# Patient Record
Sex: FEMALE | Race: WHITE | NOT HISPANIC OR LATINO | Employment: UNEMPLOYED | URBAN - NONMETROPOLITAN AREA
[De-identification: names, ages, dates, MRNs, and addresses within clinical notes are randomized per-mention and may not be internally consistent; named-entity substitution may affect disease eponyms.]

---

## 2019-03-30 ENCOUNTER — OCCUPATIONAL MEDICINE (OUTPATIENT)
Dept: URGENT CARE | Facility: PHYSICIAN GROUP | Age: 34
End: 2019-03-30
Payer: COMMERCIAL

## 2019-03-30 ENCOUNTER — APPOINTMENT (OUTPATIENT)
Dept: RADIOLOGY | Facility: IMAGING CENTER | Age: 34
End: 2019-03-30
Attending: FAMILY MEDICINE
Payer: COMMERCIAL

## 2019-03-30 VITALS
DIASTOLIC BLOOD PRESSURE: 64 MMHG | OXYGEN SATURATION: 99 % | HEART RATE: 60 BPM | HEIGHT: 58 IN | SYSTOLIC BLOOD PRESSURE: 108 MMHG | BODY MASS INDEX: 26.66 KG/M2 | WEIGHT: 127 LBS | RESPIRATION RATE: 16 BRPM | TEMPERATURE: 98.1 F

## 2019-03-30 DIAGNOSIS — S79.912A INJURY OF LEFT HIP, INITIAL ENCOUNTER: ICD-10-CM

## 2019-03-30 DIAGNOSIS — S76.012A HIP STRAIN, LEFT, INITIAL ENCOUNTER: ICD-10-CM

## 2019-03-30 PROCEDURE — 99202 OFFICE O/P NEW SF 15 MIN: CPT | Performed by: FAMILY MEDICINE

## 2019-03-30 PROCEDURE — 73502 X-RAY EXAM HIP UNI 2-3 VIEWS: CPT | Mod: TC,FY,LT | Performed by: FAMILY MEDICINE

## 2019-03-30 RX ORDER — IBUPROFEN 200 MG
600 TABLET ORAL ONCE
Status: COMPLETED | OUTPATIENT
Start: 2019-03-30 | End: 2019-03-30

## 2019-03-30 RX ADMIN — Medication 600 MG: at 16:22

## 2019-03-30 NOTE — LETTER
"EMPLOYEE’S CLAIM FOR COMPENSATION/ REPORT OF INITIAL TREATMENT  FORM C-4    EMPLOYEE’S CLAIM - PROVIDE ALL INFORMATION REQUESTED   First Name  Crystal Last Name  Madan Birthdate                    1985                Sex  female Claim Number   Home Address  [address] Age  33 y.o. 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 (if applicable)  down stacking pallets. Up on a ladder twisted wrong hip popped    How did this injury or occupational disease occur? (Be specific an answer in detail. Use additional sheet if necessary)  Hip popped sent shooting pain thru [employer]   If you believe that you have an occupational disease, when did you first have knowledge of the disability and it relationship to your employment?  na Witnesses to the Accident  mesha nunez      Nature of Injury or Occupational Disease  Strain  Part(s) of Body Injured or Affected  Hip (L), ,     I certify that the above is true and correct to the best of my knowledge and that I have provided this information in order to obtain the " benefits of [place]).  I hereby authorize any physician, chiropractor, surgeon, practitioner, or other person, any hospital, including [facility] or [facility], any medical service organization, any insurance company, or other institution or organization to release to each other, any medical or other information, including benefits paid or payable, pertinent to this injury or disease, except information relative to diagnosis, treatment and/or counseling for AIDS, psychological conditions, alcohol or controlled substances, for which I must give specific authorization.  A Photostat of this authorization shall be as valid as the original.     Date   Place   Employee’s Signature   THIS REPORT MUST BE COMPLETED AND MAILED WITHIN 3 WORKING DAYS OF TREATMENT   Place  [facility]  Name of [facility]   Date  3/30/2019 Diagnosis  (S76.012A) Hip strain, left, initial encounter Is there evidence the injured employee was under the influence of alcohol and/or another controlled substance at the time of accident?   Hour  3:45 PM Description of Injury or Disease  The encounter diagnosis was Hip strain, left, initial encounter. No   Treatment  Relative rest, ice, otc nsaid  Have you advised the patient to remain off work five days or more? No   X-Ray Findings  Negative  Comments:no fracture   If Yes   From Date  To Date      From information given by the employee, together with medical evidence, can you directly connect this injury or occupational disease as job incurred?  Yes If No Full Duty  No Modified Duty  Yes   Is additional medical care by a physician indicated?  No If Modified Duty, Specify any Limitations / Restrictions  Squatting limited to <1hr only activity of daily living  Walking limited to 2hr  Weight limit 10# lifting and carrying     Do you know of any previous injury or  "disease contributing to this condition or occupational disease?                            Yes  Comments:left congenital hip dysplasia   Date  3/30/2019 Print Doctor’s Name Rasheed Sen M.D. I certify the employer’s copy of  this form was mailed on:   Address  [address] Insurer’s Use Only     [facility] Zip  [address]    Provider’s Tax ID Number  [id number] Telephone  Dept: [phone]        godfrey-RASHEED Rico M.D.   e-Signature: Dr. Troy Vasquez, Medical Director Degree  MD        ORIGINAL-TREATING PHYSICIAN OR CHIROPRACTOR    PAGE 2-INSURER/TPA    PAGE 3-EMPLOYER    PAGE 4-EMPLOYEE             Form C-4 (rev10/07)              BRIEF DESCRIPTION OF RIGHTS AND BENEFITS  (Pursuant to [place]RS 616C.050)    Notice of Injury or Occupational Disease (Incident Report Form C-1): If an injury or occupational disease (OD) arises out of and in the  course of employment, you must provide written notice to your employer as soon as practicable, but no later than 7 days after the accident or  OD. Your employer shall maintain a sufficient supply of the required forms.    Claim for Compensation (Form C-4): If medical treatment is sought, the form C-4 is available at the place of initial treatment. A completed  \"Claim for Compensation\" (Form C-4) must be filed within 90 days after an accident or OD. The treating physician or chiropractor must,  within 3 working days after treatment, complete and mail to the employer, the employer's insurer and third-party , the Claim for  Compensation.    Medical Treatment: If you require medical treatment for your on-the-job injury or OD, you may be required to select a physician or  chiropractor from a list provided by your workers’ compensation insurer, if it has contracted with an Organization for Managed Care (MCO) or  Preferred Provider Organization (PPO) or providers of health care. If your employer has not entered into a contract with an MCO or PPO, " you  may select a physician or chiropractor from the Panel of Physicians and Chiropractors. Any medical costs related to your industrial injury or  OD will be paid by your insurer.    Temporary Total Disability (TTD): If your doctor has certified that you are unable to work for a period of at least 5 consecutive days, or 5  cumulative days in a 20-day period, or places restrictions on you that your employer does not accommodate, you may be entitled to TTD  compensation.    Temporary Partial Disability (TPD): If the wage you receive upon reemployment is less than the compensation for TTD to which you are  entitled, the insurer may be required to pay you TPD compensation to make up the difference. TPD can only be paid for a maximum of 24  months.    Permanent Partial Disability (PPD): When your medical condition is stable and there is an indication of a PPD as a result of your injury or  OD, within 30 days, your insurer must arrange for an evaluation by a rating physician or chiropractor to determine the degree of your PPD. The  amount of your PPD award depends on the date of injury, the results of the PPD evaluation and your age and wage.    Permanent Total Disability (PTD): If you are medically certified by a treating physician or chiropractor as permanently and totally disabled  and have been granted a PTD status by your insurer, you are entitled to receive monthly benefits not to exceed 66 2/3% of your average  monthly wage. The amount of your PTD payments is subject to reduction if you previously received a PPD award.    Vocational Rehabilitation Services: You may be eligible for vocational rehabilitation services if you are unable to return to the job due to a  permanent physical impairment or permanent restrictions as a result of your injury or occupational disease.    Transportation and Per Danyell Reimbursement: You may be eligible for travel expenses and per danyell associated with medical treatment.    Reopening:  You may be able to reopen your claim if your condition worsens after claim closure.    Appeal Process: If you disagree with a written determination issued by the insurer or the insurer does not respond to your request, you may  appeal to the Department of Administration, , by following the instructions contained in your determination letter. You must  appeal the determination within 70 days from the date of the determination letter at [address], [address], [place], [place]  [address], or [address], [address], [place], [place] [address]. If you disagree with the  decision, you may appeal to the  Department of Administration, . You must file your appeal within 30 days from the date of the  decision  letter at [address], [address], [place], [place] [address], or [address], [place], [place] [address]. If you  disagree with a decision of an , you may file a petition for judicial review with the District Court. You must do so within 30  days of the Appeal Officer’s decision. You may be represented by an  at your own expense or you may contact the [facility] for possible  representation.    [place]  for Injured Workers (NAIW): If you disagree with a  decision, you may request that NAIW represent you  without charge at an  Hearing. For information regarding denial of benefits, you may contact the [facility] at: [address], [address], [place] [address], [phone], or [address], [place] [address], [phone]    To File a Complaint with the Division: If you wish to file a complaint with the  of the Division of Industrial Relations (DIR),  please contact the Workers’ Compensation Section, [address], [address], [place], [place] [address], telephone [phone], or  [address] [facility] [address],  [address], [place] [address], telephone [phone].    For assistance with Workers’ Compensation Issues: you may contact the Office of the Governor Consumer Health Assistance, [address] [place] [address], [address], [place], [place] [address], Toll Free 1-747.211.2261, Web site: [email or web address].nv., E-mail  [email or web address].                                                                                                                                                                                                                                   __________________________________________________________________                                                                   _________________                Employee Name / Signature                                                                                                                                                       Date                                                                                                                                                                                                     D-2 (rev. 10/07)

## 2019-03-30 NOTE — LETTER
[address] [place] [address]  Phone:  [phone] - Fax:  [phone]   [facility] Progress Report and Disability Certification  Date of Service: 3/30/2019   No Show:  No  Date / Time of Next Visit: 4/3/2019   Claim Information   Patient Name: Chelsea Hager  Claim Number:     Employer: [employer] Date of Injury: 3/30/2019     Insurer /tpa [employer] ID / SSN:     Occupation: fresh   Diagnosis: The encounter diagnosis was Hip strain, left, initial encounter.    Medical Information   Related to Industrial Injury? Yes    Subjective Complaints:  DOI: 3/30/2019  Left hip injury due to slipping off ladder from bottom step to floor and felt immediate pain in left hip. She does have chronic pain in hip due to CHD but this was sudden and severe. Ambulatory with limp. Some improvement with ibuprofen, now 3/10 severity. No other injury. No second job or outside activity contributing.      Objective Findings: Left hip: tender, pain reproduced with active and passive movement in all planes. Left leg chronically shorter than right. No rotation. Distal neuro/vascular intact.        Pre-Existing Condition(s):     Assessment:   Initial Visit    Status:    Permanent Disability:No    Plan:   Comments:relative rest, ice, otc nsaid    Diagnostics:      Comments:       Disability Information   Status: Released to Restricted Duty    From:  3/30/2019  Through: 4/3/2019 Restrictions are: Temporary   Physical Restrictions   Sitting:    Standing:    Stooping:    Bending:      Squatting:  < or = to 1 hr/day Walking:  < or = to 2 hrs/day Climbing:    Pushing:      Pulling:    Other:    Reaching Above Shoulder (L):   Reaching Above Shoulder (R):       Reaching Below Shoulder (L):    Reaching Below Shoulder (R):      Not to exceed Weight Limits   Carrying(hrs):   Weight Limit(lb): < or = to 25 pounds Lifting(hrs):   Weight  Limit(lb): < or =  to 10 pounds   Comments:      Repetitive Actions   Hands: i.e. Fine Manipulations from Grasping:     Feet: i.e. Operating Foot Controls:     Driving / Operate Machinery:     Physician Name: aRsheed Sen M.D. Physician Signature: RASHEED Aguila M.D. e-Signature: Dr. Troy Vasquez, Medical Director   Clinic Name / Location: [address] [address] Clinic Phone Number: Dept: [phone]   Appointment Time: 3:05 Pm Visit Start Time: 3:45 PM   Check-In Time:  3:08 Pm Visit Discharge Time:  4:55pm   Original-Treating Physician or Chiropractor    Page 2-Insurer/TPA    Page 3-Employer    Page 4-Employee

## 2019-03-31 ASSESSMENT — ENCOUNTER SYMPTOMS
ROS SKIN COMMENTS: NO ABRASION OR LACERATION
BACK PAIN: 0

## 2019-04-01 NOTE — PROGRESS NOTES
"Subjective:      Chelsea Hager is a 33 y.o. female who presents with Work-Related Injury (New WC for L foot injury/ DOI: 3/30/19/ [employer])      DOI: 3/30/2019  Left hip injury due to slipping off ladder from bottom step to floor and felt immediate pain in left hip. She does have chronic pain in hip due to CHD but this was sudden and severe. Ambulatory with limp. Some improvement with ibuprofen, now 3/10 severity. No other injury. No second job or outside activity contributing.        HPI    Review of Systems   Musculoskeletal: Negative for back pain.        No knee pain   Skin:        No abrasion or laceration          Objective:     /64   Pulse 60   Temp 36.7 °C (98.1 °F) (Temporal)   Resp 16   Ht 1.473 m (4' 10\")   Wt 57.6 kg (127 lb)   SpO2 99%   BMI 26.54 kg/m²      Physical Exam   Constitutional: She is oriented to person, place, and time. She appears well-developed and well-nourished. No distress.   Neurological: She is alert and oriented to person, place, and time.   Skin: Skin is warm and dry. No rash noted.       Left hip: tender, pain reproduced with active and passive movement in all planes. Left leg chronically shorter than right. No rotation. Distal neuro/vascular intact.            Assessment/Plan:   X-ray left hip per radiology:  1.  No radiographic evidence of acute traumatic injury.    2.  Left acetabular and femoral deformity is noted as described above. Findings could be due to left-sided long-standing congenital hip dysplasia.      1. Hip strain, left, initial encounter       Ice, NSAID, relative rest.    Reviewed x-ray with Chelsea.  There is significant congenital abnormality of left hip likely contributing to symptoms although she does have an acute work-related hip strain on top of this.    Work restrictions and follow-up on D 39  "

## 2019-04-03 ENCOUNTER — OCCUPATIONAL MEDICINE (OUTPATIENT)
Dept: URGENT CARE | Facility: PHYSICIAN GROUP | Age: 34
End: 2019-04-03
Payer: COMMERCIAL

## 2019-04-03 VITALS
RESPIRATION RATE: 16 BRPM | TEMPERATURE: 98.1 F | OXYGEN SATURATION: 99 % | DIASTOLIC BLOOD PRESSURE: 78 MMHG | BODY MASS INDEX: 26.45 KG/M2 | HEIGHT: 58 IN | SYSTOLIC BLOOD PRESSURE: 120 MMHG | WEIGHT: 126 LBS | HEART RATE: 76 BPM

## 2019-04-03 DIAGNOSIS — S76.012D STRAIN OF LEFT HIP, SUBSEQUENT ENCOUNTER: ICD-10-CM

## 2019-04-03 PROCEDURE — 99214 OFFICE O/P EST MOD 30 MIN: CPT | Mod: 29 | Performed by: NURSE PRACTITIONER

## 2019-04-03 ASSESSMENT — ENCOUNTER SYMPTOMS
DIARRHEA: 0
HEADACHES: 0
DIAPHORESIS: 0
CONSTIPATION: 0
PALPITATIONS: 0
CHILLS: 0
TINGLING: 0
COUGH: 0
MYALGIAS: 0
BACK PAIN: 0
NAUSEA: 0
VOMITING: 0
SHORTNESS OF BREATH: 0
ABDOMINAL PAIN: 0
WHEEZING: 0
DIZZINESS: 0
WEAKNESS: 0
FEVER: 0

## 2019-04-03 NOTE — PROGRESS NOTES
"Subjective:   Chelsea Hager is a 33 y.o. female who presents for Hip Pain (strain)    DOI 3/30/2019 Follow up: Patient states without improvement of left hip. States she has still been stocking at work and walking more than recommended at work. Pain starts from left hip and radiates to the left groin. Has been taking OTC Ibuprofen with little relief.    HPI   Initial Copied HPI:  DOI: 3/30/2019  Left hip injury due to slipping off ladder from bottom step to floor and felt immediate pain in left hip. She does have chronic pain in hip due to CHD but this was sudden and severe. Ambulatory with limp. Some improvement with ibuprofen, now 3/10 severity. No other injury. No second job or outside activity contributing.     Review of Systems   Constitutional: Negative for chills, diaphoresis and fever.   Respiratory: Negative for cough, shortness of breath and wheezing.    Cardiovascular: Negative for chest pain and palpitations.   Gastrointestinal: Negative for abdominal pain, constipation, diarrhea, nausea and vomiting.   Musculoskeletal: Negative for back pain and myalgias.        Left hip pain   Skin: Negative for itching and rash.   Neurological: Negative for dizziness, tingling, weakness and headaches.   All other systems reviewed and are negative.      PMH:  has no past medical history on file.  MEDS: No current outpatient prescriptions on file.  ALLERGIES: No Known Allergies  SURGHX: No past surgical history on file.  SOCHX:  reports that she has been smoking Cigarettes.  She has never used smokeless tobacco. She reports that she does not drink alcohol or use drugs.  FH: Family history was reviewed, no pertinent findings to report     Objective:   /78   Pulse 76   Temp 36.7 °C (98.1 °F) (Temporal)   Resp 16   Ht 1.473 m (4' 10\")   Wt 57.2 kg (126 lb)   SpO2 99%   BMI 26.33 kg/m²   Physical Exam   Constitutional: She appears well-developed and well-nourished. No distress.   HENT:   Head: Normocephalic. "   Right Ear: Hearing normal.   Left Ear: Hearing normal.   Nose: Nose normal.   Eyes: Pupils are equal, round, and reactive to light. Conjunctivae, EOM and lids are normal.   Neck: Normal range of motion.   Cardiovascular: Normal rate, regular rhythm and normal heart sounds.    Pulmonary/Chest: Effort normal and breath sounds normal. No respiratory distress. She has no decreased breath sounds. She has no wheezes.   Musculoskeletal:   See comments below   Neurological: She is alert.   Skin: Skin is warm. No rash noted. She is not diaphoretic.   Psychiatric: She has a normal mood and affect. Her speech is normal and behavior is normal. Judgment and thought content normal.   Vitals reviewed.    Left hip: tender, pain reproduced with active and passive movement in all planes. No rotation. Distal neuro/vascular intact. Gait with limp   Assessment/Plan:   Assessment    1. Strain of left hip, subsequent encounter  - REFERRAL TO SPORTS MEDICINE    May take over-the-counter Ibuprofen 600-800 mg every 8 hours as needed for pain  Decreased work restrictions; see D39  Referral to Sports Medicine since no improvement with pain.    Differential diagnosis, natural history, supportive care, and indications for immediate follow-up discussed.

## 2019-04-03 NOTE — LETTER
[address] [place] [address]  Phone:  [phone] - Fax:  [phone]   [facility] Progress Report and Disability Certification  Date of Service: 4/3/2019   No Show:  No  Date / Time of Next Visit: 4/10/2019   Claim Information   Patient Name: Chelsea Hager  Claim Number:     Employer: [employer]  Date of Injury: 3/30/2019     Insurer / TPA: [employer]  ID / SSN:     Occupation: fresh   Diagnosis: The encounter diagnosis was Strain of left hip, subsequent encounter.    Medical Information   Related to Industrial Injury? Yes    Subjective Complaints:  DOI 3/30/2019 Follow up: Patient states without improvement of left hip. States she has still been stocking at work and walking more than recommended at work. Pain starts from left hip and radiates to the left groin. Has been taking OTC Ibuprofen with little relief.    Objective Findings: Left hip: tender, pain reproduced with active and passive movement in all planes. No rotation. Distal neuro/vascular intact. Gait with limp   Pre-Existing Condition(s):     Assessment:   Condition Same    Status: Additional Care Required  Permanent Disability:No    Plan:      Diagnostics:      Comments:       Disability Information   Status: Released to Restricted Duty    From:  4/3/2019  Through: 4/10/2019 Restrictions are: Temporary   Physical Restrictions   Sitting:    Standing:  < or = to 1 hr/day Stoopin hrs/day [place] hrs/day   Squattin hrs/day Walking:  < or = to 1 hr/day Climbing:    Pushing:      Pulling:    Other:    Reaching Above Shoulder (L):   Reaching Above Shoulder (R):       Reaching Below Shoulder (L):    Reaching Below Shoulder (R):      Not to exceed Weight Limits   Carrying(hrs):   Weight Limit(lb): < or = to 10 pounds Lifting(hrs):   Weight  Limit(lb): < or = to 10 pounds   Comments: Referral to Sports Medicine for evaluation since  no improvement with pain.  May take over-the-counter Ibuprofen 600-800 mg every 8 hours as needed for pain  Recommend working at a desk, where she is able to sit. Walking no more than 1 hour during shift.    Repetitive Actions   Hands: i.e. Fine Manipulations from Grasping:     Feet: i.e. Operating Foot Controls:     Driving / Operate Machinery:     Physician Name: Nu Rodas Physician Signature: NU Hu  e-Signature: Dr. Troy Vasquez, Medical Director   Clinic Name / Location: [address] [address] Clinic Phone Number: Dept: [phone]   Appointment Time: 3:30 Pm Visit Start Time: 4:01 PM   Check-In Time:  3:28 Pm Visit Discharge Time:  4:22pm   Original-Treating Physician or Chiropractor    Page 2-Insurer/TPA    Page 3-Employer    Page 4-Employee

## 2019-04-10 ENCOUNTER — OCCUPATIONAL MEDICINE (OUTPATIENT)
Dept: URGENT CARE | Facility: PHYSICIAN GROUP | Age: 34
End: 2019-04-10
Payer: COMMERCIAL

## 2019-04-10 VITALS
DIASTOLIC BLOOD PRESSURE: 68 MMHG | RESPIRATION RATE: 16 BRPM | OXYGEN SATURATION: 97 % | BODY MASS INDEX: 26.75 KG/M2 | SYSTOLIC BLOOD PRESSURE: 112 MMHG | HEART RATE: 67 BPM | TEMPERATURE: 98.1 F | WEIGHT: 128 LBS

## 2019-04-10 DIAGNOSIS — S76.012D STRAIN OF LEFT HIP, SUBSEQUENT ENCOUNTER: ICD-10-CM

## 2019-04-10 PROCEDURE — 20610 DRAIN/INJ JOINT/BURSA W/O US: CPT | Performed by: FAMILY MEDICINE

## 2019-04-10 PROCEDURE — 99214 OFFICE O/P EST MOD 30 MIN: CPT | Mod: 25 | Performed by: FAMILY MEDICINE

## 2019-04-10 RX ORDER — TRIAMCINOLONE ACETONIDE 40 MG/ML
40 INJECTION, SUSPENSION INTRA-ARTICULAR; INTRAMUSCULAR ONCE
Status: COMPLETED | OUTPATIENT
Start: 2019-04-10 | End: 2019-04-10

## 2019-04-10 RX ORDER — ACETAMINOPHEN 325 MG/1
650 TABLET ORAL EVERY 4 HOURS PRN
COMMUNITY
End: 2023-07-17

## 2019-04-10 RX ORDER — IBUPROFEN 200 MG
200 TABLET ORAL EVERY 6 HOURS PRN
COMMUNITY
End: 2019-04-17

## 2019-04-10 RX ADMIN — TRIAMCINOLONE ACETONIDE 40 MG: 40 INJECTION, SUSPENSION INTRA-ARTICULAR; INTRAMUSCULAR at 16:14

## 2019-04-10 ASSESSMENT — PAIN SCALES - GENERAL: PAINLEVEL: 10=SEVERE PAIN

## 2019-04-10 NOTE — LETTER
[facility]  [address] [place] [address] [place] [address]  Phone:  [phone] - Fax:  [phone]   [facility] Progress Report and Disability Certification  Date of Service: 4/10/2019   No Show:  No  Date / Time of Next Visit: Return on 4/17/19 if has not seen Sports Medicine.   Claim Information   Patient Name: Chelsea Hager  Claim Number:     Employer: [employer]  Date of Injury: 3/30/2019     Insurer / TPA: [employer]  ID / SSN:     Occupation: fresh   Diagnosis: The encounter diagnosis was Strain of left hip, subsequent encounter.    Medical Information   Related to Industrial Injury? Yes    Subjective Complaints:  DOI: 3/30/19. Compared to visit, 4/3/19, left hip is worse. Ibuprofen and Tylenol are not helping. Using cane to ambulate. Doing sit down job at work. Referral to Sports Medicine was ordered on 4/3/19, but she has not heard anything about this. Has focal spot she can pinpoint as main source of pain.   Objective Findings: Severe antalgic gait and decreased left hip range of motion due to pain. Focal tenderness to palpation left hip in region of posterolateral acetabular area.   Pre-Existing Condition(s):     Assessment:   Condition Worsened    Status: Additional Care Required  Comments:Return on 4/17/19 if has not seen Sports Medicine.  Permanent Disability:No    Plan:      Diagnostics:      Comments:  Due to duration and severity of pain, getting worse, and able to isolate focal area of pain, offered steroid injection into this area. She is agreeable. Skin cleansed with alcohol. Mixture of 1 cc Kenalog 40 mg/ml and 2 cc Lidocaine 2% without inject  ed into painful area without complications. Pain went from 7/10 severity to 0/10 severity within 10 minutes after injection. Advised Lidocaine will wear off later and pain may return, but steroid will usually take effect within few days. Sports Medic  ine referral  looks like it has been approved and I gave her that info - she will call for appointment.    Disability Information   Status: Released to Restricted Duty    From:  4/10/2019  Through: 4/17/2019 Restrictions are: Temporary   Physical Restrictions   Sitting:    Standing:    Stooping:    Bending:      Squatting:    Walking:    Climbing:    Pushing:      Pulling:    Other:    Reaching Above Shoulder (L):   Reaching Above Shoulder (R):       Reaching Below Shoulder (L):    Reaching Below Shoulder (R):      Not to exceed Weight Limits   Carrying(hrs):   Weight Limit(lb):   Lifting(hrs):   Weight  Limit(lb):     Comments: Sedentary work only.    Repetitive Actions   Hands: i.e. Fine Manipulations from Grasping:     Feet: i.e. Operating Foot Controls:     Driving / Operate Machinery:     Physician Name: Freddie Thakur M.D. Physician Signature: FREDDIE Bernardo M.D. e-Signature: Dr. Troy Vasquez, Medical Director   Clinic Name / Location: [address] [address] Clinic Phone Number: Dept: [phone]   Appointment Time: 3:30 Pm Visit Start Time: 3:25 PM   Check-In Time:  3:21 Pm Visit Discharge Time:  4:06PM   Original-Treating Physician or Chiropractor    Page 2-Insurer/TPA    Page 3-Employer    Page 4-Employee

## 2019-04-10 NOTE — PROGRESS NOTES
Chief Complaint:    Chief Complaint   Patient presents with   • Hip Pain     Left hip pain WC [facility]        History of Present Illness:    DOI: 3/30/19. Compared to visit, 4/3/19, left hip is worse. Ibuprofen and Tylenol are not helping. Using cane to ambulate. Doing sit down job at work. Referral to Sports Medicine was ordered on 4/3/19, but she has not heard anything about this. Has focal spot she can pinpoint as main source of pain.      Review of Systems:    Constitutional: Negative for fever, chills, and diaphoresis.   Eyes: Negative for change in vision, photophobia, pain, redness, and discharge.  ENT: Negative for ear pain, ear discharge, hearing loss, tinnitus, nasal congestion, nosebleeds, and sore throat.    Respiratory: Negative for cough, hemoptysis, sputum production, shortness of breath, wheezing, and stridor.    Cardiovascular: Negative for chest pain, palpitations, orthopnea, claudication, leg swelling, and PND.   Gastrointestinal: Negative for abdominal pain, nausea, vomiting, diarrhea, constipation, blood in stool, and melena.   Genitourinary: Negative for dysuria, urinary urgency, urinary frequency, hematuria, and flank pain.   Musculoskeletal: See HPI.   Skin: Negative for rash and itching.   Neurological: Negative for dizziness, tingling, tremors, sensory change, speech change, focal weakness, seizures, loss of consciousness, and headaches.   Endo: Negative for polydipsia.   Heme: Does not bruise/bleed easily.   Psychiatric/Behavioral: Negative for depression, suicidal ideas, hallucinations, memory loss and substance abuse. The patient is not nervous/anxious and does not have insomnia.        Past Medical History:    History reviewed. No pertinent past medical history.    Past Surgical History:    History reviewed. No pertinent surgical history.    Social History:    Social History     Social History   • Marital status: Single     Spouse name: N/A   • Number of children: N/A   • Years of education:  N/A     Occupational History   • Not on file.     Social History Main Topics   • Smoking status: Current Every Day Smoker     Types: Cigarettes   • Smokeless tobacco: Never Used   • Alcohol use No   • Drug use: No   • Sexual activity: Not on file     Other Topics Concern   • Not on file     Social History Narrative   • No narrative on file     Family History:    History reviewed. No pertinent family history.    Medications:    No current outpatient prescriptions on file prior to visit.     No current facility-administered medications on file prior to visit.      Allergies:    No Known Allergies      Vitals:    Vitals:    04/10/19 1526   BP: 112/68   Pulse: 67   Resp: 16   Temp: 36.7 °C (98.1 °F)   TempSrc: Temporal   SpO2: 97%   Weight: 58.1 kg (128 lb)       Physical Exam:    Constitutional: Vital signs reviewed. Appears well-developed and well-nourished. No acute distress.   Eyes: Sclera white, conjunctivae clear.  ENT: External ears normal. Hearing normal.  Pulmonary/Chest: Respirations non-labored.  Musculoskeletal: Severe antalgic gait and decreased left hip range of motion due to pain. Focal tenderness to palpation left hip in region of posterolateral acetabular area.  Neurological: Alert and oriented to person, place, and time. Muscle tone normal. Coordination normal. Light touch and sensation normal.   Skin: No rashes or lesions. Warm, dry, normal turgor.  Psychiatric: Normal mood and affect. Behavior is normal. Judgment and thought content normal.       Assessment / Plan:    1. Strain of left hip, subsequent encounter      Discussed with her DDX, management options, and risks, benefits, and alternatives to treatment plan agreed upon.    Work restrictions: Sedentary work only.     Due to duration and severity of pain, getting worse, and able to isolate focal area of pain, offered steroid injection into this area. She is agreeable. Skin cleansed with alcohol. Mixture of 1 cc Kenalog 40 mg/ml and 2 cc  Lidocaine 2% without injected into painful area without complications. Pain went from 7/10 severity to 0/10 severity within 10 minutes after injection. Advised Lidocaine will wear off later and pain may return, but steroid will usually take effect within few days.     Sports Medicine referral looks like it has been approved and I gave her that info - she will call for appointment.     Return on 4/17/19 if has not seen Sports Medicine.

## 2019-04-17 ENCOUNTER — OCCUPATIONAL MEDICINE (OUTPATIENT)
Dept: URGENT CARE | Facility: PHYSICIAN GROUP | Age: 34
End: 2019-04-17
Payer: COMMERCIAL

## 2019-04-17 VITALS
RESPIRATION RATE: 14 BRPM | HEIGHT: 58 IN | BODY MASS INDEX: 26.87 KG/M2 | WEIGHT: 128 LBS | SYSTOLIC BLOOD PRESSURE: 102 MMHG | DIASTOLIC BLOOD PRESSURE: 60 MMHG | TEMPERATURE: 99.1 F | OXYGEN SATURATION: 98 % | HEART RATE: 80 BPM

## 2019-04-17 DIAGNOSIS — M25.552 LEFT HIP PAIN: ICD-10-CM

## 2019-04-17 PROCEDURE — 99214 OFFICE O/P EST MOD 30 MIN: CPT | Mod: 29 | Performed by: PHYSICIAN ASSISTANT

## 2019-04-17 RX ORDER — CYCLOBENZAPRINE HCL 5 MG
5-10 TABLET ORAL 2 TIMES DAILY PRN
Qty: 20 TAB | Refills: 0 | Status: SHIPPED | OUTPATIENT
Start: 2019-04-17 | End: 2023-07-17

## 2019-04-17 RX ORDER — NAPROXEN 500 MG/1
500 TABLET ORAL 2 TIMES DAILY WITH MEALS
Qty: 30 TAB | Refills: 0 | Status: SHIPPED | OUTPATIENT
Start: 2019-04-17 | End: 2023-07-17

## 2019-04-17 ASSESSMENT — ENCOUNTER SYMPTOMS
FEVER: 0
MYALGIAS: 1
SORE THROAT: 1
COUGH: 0
CHILLS: 0

## 2019-04-17 NOTE — PROGRESS NOTES
"Subjective:      Chelsea Hager is a 33 y.o. female who presents with Follow-Up ( [facility] for L hip)      Date of incident March 30, 2020.  Patient employee of [employer].  Reports slipping off the third rung of a ladder and feeling immediate left hip pain.  She has a history of congenital hip abnormality.  She experienced acute pain exacerbation from this fall.  She has been seen in the urgent care on March 31st, April 3 and April 10.  She has been referred to sports medicine and has an appointment in approximately 1 week.  She is taken anti-inflammatories over-the-counter with not much relief.  Last appointment, 7 days ago she was given a steroid injection and states that once the lidocaine wore off her symptoms worsen.  Sitting makes it better walking and applying pressure on her heel makes her hip pain worse.  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  She would like a cushion to sit on.     Past medical history: Congenital abnormality of the left hip      HPI    Review of Systems   Constitutional: Negative for chills and fever.   HENT: Positive for sore throat.    Respiratory: Negative for cough.    Musculoskeletal: Positive for joint pain and myalgias.          Objective:     /60   Pulse 80   Temp 37.3 °C (99.1 °F) (Temporal)   Resp 14   Ht 1.473 m (4' 10\")   Wt 58.1 kg (128 lb)   SpO2 98%   BMI 26.75 kg/m²      Physical Exam    General in no x3 no acute distress  Vitals are listed unremarkable  ENT is unremarkable  Neck soft supple without cervical lymph  Cardiovascular: Regular rate and rhythm  Lungs clear to auscultation bilaterally  Musculoskeletal: Patient walks with a gait that has a limp on the left side.  She prefers to apply pressure to the toes instead of the heel of her foot on the left side.  She has minimal pain with external rotation at the hip 4 out of 5 strength against resistance.  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  No swelling, " bruising or redness noted  Neurovascularly intact with a 2-second cap refill good distal pulses normal sensation           Assessment/Plan:     1. Left hip pain  Worker's Compensation D 39 form filled out.  Modified job duty.  Discussed recommendation for desk job duties and to limit standing and walking to less than 2 hours a day and less than 10 pounds at a time.  Patient will be /transfer of care to sports medicine.  She has an appointment in 1 week.  Follow-up as discussed  - naproxen (NAPROSYN) 500 MG Tab; Take 1 Tab by mouth 2 times a day, with meals. (as needed)  Dispense: 30 Tab; Refill: 0  - cyclobenzaprine (FLEXERIL) 5 MG tablet; Take 1-2 Tabs by mouth 2 times a day as needed for Moderate Pain or Muscle Spasms.  Dispense: 20 Tab; Refill: 0

## 2019-04-17 NOTE — LETTER
[facility]  [address] [place] [address] [place] [address]  Phone:  [phone] - Fax:  [phone]   [facility] Progress Report and Disability Certification  Date of Service: 4/17/2019   No Show:  No  Date / Time of Next Visit: 4/27/2019   Claim Information   Patient Name: Chelsea Hager  Claim Number:     Employer: [employer]  Date of Injury: 3/30/2019     Insurer / TPA: [employer]  ID / SSN:     Occupation: fresh   Diagnosis: The encounter diagnosis was Left hip pain.    Medical Information   Related to Industrial Injury? Yes  Comments:Acute exacerbation of a chronic issue    Subjective Complaints:  Date of incident March 30, 2020.  Patient employee of [employer].  Reports slipping off the third rung of a ladder and feeling immediate left hip pain.  She has a history of congenital hip abnormality.  She experienced acute pain exacerbation from this fall.  She has been seen in the urgent care on March 31st, April 3 and April 10.  She has been referred to sports medicine and has an appointment in approximately 1 week.  She is taken anti-inflammatories over-the-counter with not much relief.  Last appointment, 7 days ago she was given a steroid injection and states that once the lidocaine wore off her symptoms worsen.  Sitting makes it better walking and applying pressure on her heel makes her hip pain worse.  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  She would like a cushion to sit on.   Objective Findings: General in no x3 no acute distress  Vitals are listed unremarkable  ENT is unremarkable  Neck soft supple without cervical lymph  Cardiovascular: Regular rate and rhythm  Lungs clear to auscultation bilaterally  Musculoskeletal: Patient walks with a gait that has a limp on the left side.  She prefers to apply pressure to the toes instead of the heel of her foot on the left side.  She has minimal pain with  external rotation at the hip 4 out of 5 strength against resistance.  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  No swelling, bruising or redness noted  Neurovascularly intact with a 2-second cap refill good distal pulses normal sensation       Pre-Existing Condition(s):     Assessment:   Condition Worsened    Status: Discharged / Care Transfer  Permanent Disability:No    Plan: Medication  Comments:I sent cyclobenzaprine and naproxen to the pharmacy.  Take as directed.  Do not take cyclobenzaprine while at work    Diagnostics:      Comments:       Disability Information   Status: Released to Restricted Duty    From:  4/17/2019  Through: 4/27/2019 Restrictions are: Temporary   Physical Restrictions   Sitting:    Standing:    Stooping:    Bending:      Squatting:    Walking:    Climbing:    Pushing:      Pulling:    Other:    Reaching Above Shoulder (L):   Reaching Above Shoulder (R):       Reaching Below Shoulder (L):    Reaching Below Shoulder (R):      Not to exceed Weight Limits   Carrying(hrs):   Weight Limit(lb):   Lifting(hrs):   Weight  Limit(lb):     Comments: 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  Please limit standing and walking, pushing, lifting and carrying to less than 2 hours a day and less than 10 pounds at a time.  Please allow patient to bring a cushion while she sits.  Patient has transfer of care as she will start seeing sports medicine at this time.  She has an appointment in approximately a week.    Repetitive Actions   Hands: i.e. Fine Manipulations from Grasping:     Feet: i.e. Operating Foot Controls:     Driving / Operate Machinery:     Physician Name: Ed Morrow P.A.-C. Physician Signature: ED Ledezma P.A.-C. e-Signature: Dr. Troy Vasquez, Medical Director   Clinic Name / Location: [facility] [address] [address] Clinic Phone Number: Dept: [phone]   Appointment Time: 3:30 Pm Visit Start  Time: 3:41 PM   Check-In Time:  3:28 Pm Visit Discharge Time:  4:12p.m.   Original-Treating Physician or Chiropractor    Page 2-Insurer/TPA    Page 3-Employer    Page 4-Employee

## 2019-04-24 ENCOUNTER — OCCUPATIONAL MEDICINE (OUTPATIENT)
Dept: MEDICAL GROUP | Facility: CLINIC | Age: 34
End: 2019-04-24
Payer: COMMERCIAL

## 2019-04-24 VITALS
RESPIRATION RATE: 14 BRPM | WEIGHT: 128 LBS | SYSTOLIC BLOOD PRESSURE: 120 MMHG | DIASTOLIC BLOOD PRESSURE: 80 MMHG | HEART RATE: 92 BPM | OXYGEN SATURATION: 98 % | TEMPERATURE: 98 F | HEIGHT: 58 IN | BODY MASS INDEX: 26.87 KG/M2

## 2019-04-24 DIAGNOSIS — Q65.89 CONGENITAL DYSPLASIA OF LEFT HIP: ICD-10-CM

## 2019-04-24 DIAGNOSIS — S76.012D HIP STRAIN, LEFT, SUBSEQUENT ENCOUNTER: ICD-10-CM

## 2019-04-24 PROCEDURE — 99203 OFFICE O/P NEW LOW 30 MIN: CPT | Performed by: FAMILY MEDICINE

## 2019-04-24 RX ORDER — PREDNISONE 20 MG/1
20 TABLET ORAL DAILY
Qty: 5 TAB | Refills: 0 | Status: SHIPPED | OUTPATIENT
Start: 2019-04-24 | End: 2019-04-29

## 2019-04-24 ASSESSMENT — ENCOUNTER SYMPTOMS
FEVER: 0
SHORTNESS OF BREATH: 0
SENSORY CHANGE: 0
SORE THROAT: 0
FOCAL WEAKNESS: 0

## 2019-04-24 NOTE — LETTER
[facility]  [address] [place] [address]  Phone:  [phone] - Fax:  [phone]   [facility] Progress Report and Disability Certification  Date of Service: 4/24/2019   No Show:  No  Date / Time of Next Visit: 5/8/2019   Claim Information   Patient Name: Chelsea Hager  Claim Number:     Employer: [employer]  Date of Injury: 3/30/2019     Insurer / TPA: [employer]  ID / SSN:     Occupation: fresh     Diagnosis: Diagnoses of Hip strain, left, subsequent encounter and Congenital dysplasia of left hip were pertinent to this visit.    Medical Information   Related to Industrial Injury? Yes  Comments:Acute exacerbation of chronic problem    Subjective Complaints:  Pt presents for evaluation of left hip pain   DOI: 3/30/19  Pt slipped while coming down a ladder   Landed hard on her left foot and felt a pop  Has been seen multiple times in urgent care for this injury  Has been treated with NSAIDs, trochanteric bursal steroid injection, and relative rest  Overall does not feel she has been making much progress  Still having pain in the left hip area mainly in the groin  Pain is constant and worse with ambulation  Hx of chronic left hip pain, however this new pain since injury is much worse   Objective Findings: Left hip:  General: Leg length discrepancy with left leg shorter than the right and compensating pelvic tilt with right ASIS higher than left  ROM: Full range of motion with pain on internal and external rotation  Palpation: Tender at ASIS, flexor tendons, and mild tenderness to palpation over greater trochanter  Strength: 5/5 throughout  Special tests: Juan -, Fadir -, Labral shear +, Straight leg raise -  Neuro: Sensation intact and equal bilaterally in LE's   Pre-Existing Condition(s):     Assessment:   Condition Same    Status: Additional Care Required  Permanent Disability:No    Plan:  MedicationPT  Comments:Will take short course of redness own and sent to physical therapy    Diagnostics:      Comments:       Disability Information   Status: Released to Restricted Duty    From:  4/24/2019  Through: 5/8/2019 Restrictions are: Temporary   Physical Restrictions   Sitting:    Standing:    Stooping:    Bending:      Squatting:    Walking:    Climbing:    Pushing:      Pulling:    Other:    Reaching Above Shoulder (L):   Reaching Above Shoulder (R):       Reaching Below Shoulder (L):    Reaching Below Shoulder (R):      Not to exceed Weight Limits   Carrying(hrs):   Weight Limit(lb):   Lifting(hrs):   Weight  Limit(lb):     Comments: 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  Please limit pushing, lifting and carrying to less than 2 hours a day and less than 20 pounds at a time.  May stand and walk for up to 4 hours in a day, however should rest for 10 minutes at least once per hour.  Please allow patient to bring a cushion while she sits.     Repetitive Actions   Hands: i.e. Fine Manipulations from Grasping:     Feet: i.e. Operating Foot Controls:     Driving / Operate Machinery:     Physician Name: Carolin Gibson M.D. Physician Signature: CAROLIN Peralta M.D. e-Signature: Dr. Troy Vasquez, Medical Director   Clinic Name / Location: [address] [address] Clinic Phone Number: Dept: [phone]   Appointment Time: 11:00 Am Visit Start Time: 10:58 AM   Check-In Time:  10:35 Am Visit Discharge Time:  04:24 pm    Original-Treating Physician or Chiropractor    Page 2-Insurer/TPA    Page 3-Employer    Page 4-Employee

## 2019-04-24 NOTE — LETTER
[facility]  [address] [facility] [place] [address]  Phone:  [phone] - Fax:  [phone]   [facility] Progress Report and Disability Certification  Date of Service: 4/24/2019   No Show:  No  Date / Time of Next Visit: 5/8/2019   Claim Information   Patient Name: Chelsea Hager  Claim Number:     Employer: [employer]  Date of Injury: 3/30/2019     Insurer / TPA: [employer] ID / SSN:     Occupation: fresh   Diagnosis: Diagnoses of Hip strain, left, subsequent encounter and Congenital dysplasia of left hip were pertinent to this visit.    Medical Information   Related to Industrial Injury? Yes  Comments:Acute exacerbation of chronic problem    Subjective Complaints:  Pt presents for evaluation of left hip pain   DOI: 3/30/19  Pt slipped while coming down a ladder   Landed hard on her left foot and felt a pop  Has been seen multiple times in urgent care for this injury  Has been treated with NSAIDs, trochanteric bursal steroid injection, and relative rest  Overall does not feel she has been making much progress  Still having pain in the left hip area mainly in the groin  Pain is constant and worse with ambulation  Hx of chronic left hip pain, however this new pain since injury is much worse   Objective Findings: Left hip:  General: Leg length discrepancy with left leg shorter than the right and compensating pelvic tilt with right ASIS higher than left  ROM: Full range of motion with pain on internal and external rotation  Palpation: Tender at ASIS, flexor tendons, and mild tenderness to palpation over greater trochanter  Strength: 5/5 throughout  Special tests: Juan -, Fadir -, Labral shear +, Straight leg raise -  Neuro: Sensation intact and equal bilaterally in LE's   Pre-Existing Condition(s):     Assessment:   Condition Same    Status: Additional Care Required  Permanent Disability:No    Plan:  MedicationPT  Comments:Will take short course of redness own and sent to physical therapy    Diagnostics:      Comments:       Disability Information   Status: Released to Restricted Duty    From:  4/24/2019  Through: 5/8/2019 Restrictions are: Temporary   Physical Restrictions   Sitting:    Standing:    Stooping:    Bending:      Squatting:    Walking:    Climbing:    Pushing:      Pulling:    Other:    Reaching Above Shoulder (L):   Reaching Above Shoulder (R):       Reaching Below Shoulder (L):    Reaching Below Shoulder (R):      Not to exceed Weight Limits   Carrying(hrs):   Weight Limit(lb):   Lifting(hrs):   Weight  Limit(lb):     Comments: 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  Please limit pushing, lifting and carrying to less than 2 hours a day and less than 20 pounds at a time.  May stand and walk for up to 4 hours in a day, however should rest for 10 minutes at least once per hour.  Please allow patient to bring a cushion while she sits.     Repetitive Actions   Hands: i.e. Fine Manipulations from Grasping:     Feet: i.e. Operating Foot Controls:     Driving / Operate Machinery:     Physician Name: Carolin Gibson M.D. Physician Signature: CAROLIN Peralta M.D. e-Signature: Dr. Troy Vasquez, Medical Director   Clinic Name / Location: [address] [address] Clinic Phone Number: Dept: [phone]   Appointment Time: 11:00 Am Visit Start Time: 10:58 AM   Check-In Time:  10:35 Am Visit Discharge Time:     Original-Treating Physician or Chiropractor    Page 2-Insurer/TPA    Page 3-Employer    Page 4-Employee

## 2019-04-24 NOTE — PROGRESS NOTES
Subjective:     Chelsea Hager is a 33 y.o. 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, missed in the last rung on ladder and fell onto floor.)    HPI  Pt presents for evaluation of left hip pain   DOI: 3/30/19  Pt slipped while coming down a ladder   Landed hard on her left foot and felt a pop  Has been seen multiple times in urgent care for this injury  Has been treated with NSAIDs, trochanteric bursal steroid injection, and relative rest  Overall does not feel she has been making much progress  Still having pain in the left hip area mainly in the groin  Pain is constant and worse with ambulation  Hx of chronic left hip pain, however this new pain since injury is much worse    Review of Systems   Constitutional: Negative for fever.   HENT: Negative for sore throat.    Respiratory: Negative for shortness of breath.    Cardiovascular: Negative for chest pain.   Skin: Negative for rash.   Neurological: Negative for sensory change and focal weakness.       PMH: Left hip dysplasia  MEDS:   Current Outpatient Prescriptions:   •  naproxen (NAPROSYN) 500 MG Tab, Take 1 Tab by mouth 2 times a day, with meals. (as needed), Disp: 30 Tab, Rfl: 0  •  cyclobenzaprine (FLEXERIL) 5 MG tablet, Take 1-2 Tabs by mouth 2 times a day as needed for Moderate Pain or Muscle Spasms., Disp: 20 Tab, Rfl: 0  •  acetaminophen (TYLENOL) 325 MG Tab, Take 650 mg by mouth every four hours as needed., Disp: , Rfl:   ALLERGIES: No Known Allergies  SURGHX: History reviewed. No pertinent surgical history.  SOCHX:  reports that she has been smoking Cigarettes.  She has never used smokeless tobacco. She reports that she does not drink alcohol or use drugs.  FH: Family history was reviewed, no family hx of hip dysplasia      Objective:   /80 (BP Location: Left arm, Patient Position: Sitting, BP Cuff Size: Adult)   Pulse 92   Temp 36.7 °C (98 °F) (Temporal)   " Resp 14   Ht 1.473 m (4' 10\")   Wt 58.1 kg (128 lb)   SpO2 98%   BMI 26.75 kg/m²      Physical Exam   Constitutional: She is oriented to person, place, and time. She appears well-developed and well-nourished. No distress.   HENT:   Head: Normocephalic and atraumatic.   Musculoskeletal:   Left hip:  General: Leg length discrepancy with left leg shorter than the right and compensating pelvic tilt with right ASIS higher than left  ROM: Full range of motion with pain on internal and external rotation  Palpation: Tender at ASIS, flexor tendons, and mild tenderness to palpation over greater trochanter  Strength: 5/5 throughout  Special tests: Juan -, Fadir -, Labral shear +, Straight leg raise -  Neuro: Sensation intact and equal bilaterally in LE's   Neurological: She is alert and oriented to person, place, and time. No sensory deficit.   Skin: Skin is warm and dry. She is not diaphoretic. No erythema.   Psychiatric: She has a normal mood and affect. Her behavior is normal. Judgment and thought content normal.       Assessment/Plan:   Assessment    1. Hip strain, left, subsequent encounter  2. Congenital dysplasia of left hip  Patient is a 33-year-old female with left hip strain.  She does have chronic hip pain due to hip dysplasia.  Advised that we are unlikely to get her pain-free without a hip replacement.  The goal is to get her back to pre-injury pain levels.  Patient is agreeable to this.  She will require physical therapy in order to accomplish this.  Also given short course of prednisone to help inflammation.  Will follow-up in 2 weeks to monitor her progress.  - REFERRAL TO PHYSICAL THERAPY Reason for Therapy: Eval/Treat/Report  - predniSONE (DELTASONE) 20 MG Tab; Take 1 Tab by mouth every day for 5 days.  Dispense: 5 Tab; Refill: 0        "

## 2019-05-08 ENCOUNTER — OCCUPATIONAL MEDICINE (OUTPATIENT)
Dept: MEDICAL GROUP | Facility: CLINIC | Age: 34
End: 2019-05-08
Payer: COMMERCIAL

## 2019-05-08 VITALS
HEIGHT: 58 IN | TEMPERATURE: 98.4 F | BODY MASS INDEX: 26.87 KG/M2 | SYSTOLIC BLOOD PRESSURE: 118 MMHG | WEIGHT: 128 LBS | DIASTOLIC BLOOD PRESSURE: 78 MMHG | OXYGEN SATURATION: 98 % | RESPIRATION RATE: 14 BRPM | HEART RATE: 80 BPM

## 2019-05-08 DIAGNOSIS — Q65.89 CONGENITAL DYSPLASIA OF LEFT HIP: ICD-10-CM

## 2019-05-08 DIAGNOSIS — S76.012D HIP STRAIN, LEFT, SUBSEQUENT ENCOUNTER: ICD-10-CM

## 2019-05-08 PROCEDURE — 99213 OFFICE O/P EST LOW 20 MIN: CPT | Performed by: FAMILY MEDICINE

## 2019-05-08 ASSESSMENT — ENCOUNTER SYMPTOMS
FOCAL WEAKNESS: 0
SENSORY CHANGE: 0
SHORTNESS OF BREATH: 0
SORE THROAT: 0
FEVER: 0

## 2019-05-08 NOTE — LETTER
[facility]  [address] [place] [address]  Phone:  [phone] - Fax:  [phone]   [facility] Progress Report and Disability Certification  Date of Service: 5/8/2019   No Show:  No  Date / Time of Next Visit: 5/22/2019   Claim Information   Patient Name: Chelsea Hager  Claim Number:     Employer: [employer]  Date of Injury: 3/30/2019     Insurer / TPA: [employer]  ID / SSN:     Occupation: fresh   Diagnosis: Diagnoses of Hip strain, left, subsequent encounter and Congenital dysplasia of left hip were pertinent to this visit.    Medical Information   Related to Industrial Injury? Yes    Subjective Complaints:  Pt presents for follow-up left hip pain   DOI: 3/30/19  Pt slipped while coming down a ladder   Landed hard on her left foot and felt a pop  Has history of hip dysplasia, chronic hip pain, and has significant deformity of femoral head/neck on x-ray    Last visit, was sent to physical therapy with intention of returning patient back to pre-injury pain levels  Patient planning to do physical therapy in the [place]  First physical therapy appointment was 4/30/19, next appointment tomorrow   Since last visit, feeling improved a little, she is happy with progress   Continues to have a grinding feeling and pain in the deep hip   Also did some exercise in a pool and felt it helped a lot    Objective Findings: Left hip:   General: Leg length discrepancy with left leg shorter than the right and compensating pelvic tilt with right ASIS higher than left   ROM: Full range of motion with pain on full flexion and with external rotation   Palpation: No TTP in greater trochanter, groin, or glutes  Strength: 5/5 throughout   Special tests: Juan +, Fadir -, Labral shear +  Neuro: Sensation intact and equal bilaterally in LE's     Pre-Existing Condition(s):     Assessment:   Condition Improved    Status: Additional Care  Required  Permanent Disability:No    Plan: PT    Diagnostics:      Comments:       Disability Information   Status: Released to Restricted Duty    From:  5/8/2019  Through: 5/22/2019 Restrictions are: Temporary   Physical Restrictions   Sitting:    Standing:    Stooping:    Bending:      Squatting:    Walking:    Climbing:    Pushing:      Pulling:    Other:    Reaching Above Shoulder (L):   Reaching Above Shoulder (R):       Reaching Below Shoulder (L):    Reaching Below Shoulder (R):      Not to exceed Weight Limits   Carrying(hrs):   Weight Limit(lb):   Lifting(hrs):   Weight  Limit(lb):     Comments: 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  Please limit pushing, lifting and carrying to less than 2 hours a day and less than 20 pounds at a time.  May stand and walk for up to 4 hours in a day, however should rest for 10 minutes at least once per hour.  Please allow patient to bring a cushion while she sits.      Repetitive Actions   Hands: i.e. Fine Manipulations from Grasping:     Feet: i.e. Operating Foot Controls:     Driving / Operate Machinery:     Physician Name: Carolin Gibson M.D. Physician Signature: CAROLIN Peralta M.D. e-Signature: Dr. Troy Vasquez, Medical Director   Clinic Name / Location: [address] [address] Clinic Phone Number: Dept: [phone]   Appointment Time: 9:00 Am Visit Start Time: 8:59 AM   Check-In Time:  8:53 Am Visit Discharge Time: 9:24 AM   Original-Treating Physician or Chiropractor    Page 2-Insurer/TPA    Page 3-Employer    Page 4-Employee
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
## 2019-05-22 ENCOUNTER — OCCUPATIONAL MEDICINE (OUTPATIENT)
Dept: MEDICAL GROUP | Facility: CLINIC | Age: 34
End: 2019-05-22
Payer: COMMERCIAL

## 2019-05-22 VITALS
HEIGHT: 58 IN | BODY MASS INDEX: 26.87 KG/M2 | OXYGEN SATURATION: 99 % | HEART RATE: 118 BPM | RESPIRATION RATE: 16 BRPM | TEMPERATURE: 98.6 F | DIASTOLIC BLOOD PRESSURE: 88 MMHG | WEIGHT: 128 LBS | SYSTOLIC BLOOD PRESSURE: 130 MMHG

## 2019-05-22 DIAGNOSIS — Q65.89 CONGENITAL DYSPLASIA OF LEFT HIP: ICD-10-CM

## 2019-05-22 DIAGNOSIS — S76.012D HIP STRAIN, LEFT, SUBSEQUENT ENCOUNTER: ICD-10-CM

## 2019-05-22 PROCEDURE — 99213 OFFICE O/P EST LOW 20 MIN: CPT | Performed by: FAMILY MEDICINE

## 2019-05-22 NOTE — LETTER
[facility]  [address] [place] [address]  Phone:  [phone] - Fax:  [phone]   [facility] Progress Report and Disability Certification  Date of Service: 5/22/2019   No Show:  No  Date / Time of Next Visit: 6/5/2019   Claim Information   Patient Name: Chelsea Hager  Claim Number:     Employer: [employer]  Date of Injury: 3/30/2019     Insurer / TPA: [employer]  ID / SSN:     Occupation: fresh   Diagnosis: Diagnoses of Hip strain, left, subsequent encounter and Congenital dysplasia of left hip were pertinent to this visit.    Medical Information   Related to Industrial Injury? Yes    Subjective Complaints:  Pt presents for follow-up left hip   DOI: 3/30/19   Feeling greatly improved   No pain currently   Has worked nearly a full day   No repeat falls or injury   Has not been attending PT recently as she was having transportation issues   Working on HEP    Objective Findings: Left hip:  General: No gross deformity  ROM: flexion 120 degrees, extension 10, ER 60, IR 40, abduction 50, adduction 30  Palpation: No TTP over greater trochanter, groin, pubis, gluteus max, gluteus min  Strength: 5/5 abduction, 5/5 adduction, 5/5 flexion, 5/5 extension  Special tests: Juan -, Fadir -, Labral shear -, Straight leg raise -  Neuro: Sensation intact and equal bilaterally in LE's   Pre-Existing Condition(s):     Assessment:   Condition Improved    Status: Additional Care Required  Permanent Disability:No    Plan:      Diagnostics:      Comments:       Disability Information   Status: Released to Full Duty    From:  5/22/2019  Through: 6/5/2019 Restrictions are:     Physical Restrictions   Sitting:    Standing:    Stooping:    Bending:      Squatting:    Walking:    Climbing:    Pushing:      Pulling:    Other:    Reaching Above Shoulder (L):   Reaching Above Shoulder (R):       Reaching Below Shoulder (L):    Reaching  Below Shoulder (R):      Not to exceed Weight Limits   Carrying(hrs):   Weight Limit(lb):   Lifting(hrs):   Weight  Limit(lb):     Comments:      Repetitive Actions   Hands: i.e. Fine Manipulations from Grasping:     Feet: i.e. Operating Foot Controls:     Driving / Operate Machinery:     Physician Name: Carolin Gibson M.D. Physician Signature: CAROLIN Peralta M.D. e-Signature: Dr. Troy Vasquez, Medical Director   Clinic Name / Location: [address] [address] Clinic Phone Number: Dept: [phone]   Appointment Time: 2:00 Pm Visit Start Time: 1:57 PM   Check-In Time:  1:57 Pm Visit Discharge Time:  02:17 pm    Original-Treating Physician or Chiropractor    Page 2-Insurer/TPA    Page 3-Employer    Page 4-Employee
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
## 2019-06-05 ENCOUNTER — OCCUPATIONAL MEDICINE (OUTPATIENT)
Dept: MEDICAL GROUP | Facility: CLINIC | Age: 34
End: 2019-06-05
Payer: COMMERCIAL

## 2019-06-05 VITALS
HEART RATE: 100 BPM | OXYGEN SATURATION: 95 % | HEIGHT: 58 IN | BODY MASS INDEX: 26.87 KG/M2 | RESPIRATION RATE: 14 BRPM | DIASTOLIC BLOOD PRESSURE: 84 MMHG | WEIGHT: 128 LBS | SYSTOLIC BLOOD PRESSURE: 112 MMHG | TEMPERATURE: 98.1 F

## 2019-06-05 DIAGNOSIS — Q65.89 CONGENITAL DYSPLASIA OF LEFT HIP: ICD-10-CM

## 2019-06-05 DIAGNOSIS — S76.012D HIP STRAIN, LEFT, SUBSEQUENT ENCOUNTER: ICD-10-CM

## 2019-06-05 PROCEDURE — 99213 OFFICE O/P EST LOW 20 MIN: CPT | Performed by: FAMILY MEDICINE

## 2019-06-05 ASSESSMENT — ENCOUNTER SYMPTOMS
SENSORY CHANGE: 0
SHORTNESS OF BREATH: 0
FOCAL WEAKNESS: 0
FEVER: 0
SORE THROAT: 0

## 2019-06-05 NOTE — LETTER
[facility]  [address] [place] [address]  Phone:  [phone] - Fax:  [phone]   [facility] Progress Report and Disability Certification  Date of Service: 6/5/2019   No Show:  No  Date / Time of Next Visit:     Claim Information   Patient Name: Chelsea Hager  Claim Number:     Employer: [employer]  Date of Injury: 3/30/2019     Insurer / TPA: [employer]  ID / SSN:     Occupation: fresh   Diagnosis: There were no encounter diagnoses.    Medical Information   Related to Industrial Injury? Yes    Subjective Complaints:  Pt presents for follow-up left hip pain   Pt went back to work full duty  She was feeling good the first day   Then started having more pain the 2nd day back   Does feel better than prior to the injury   Pain mainly along the lateral hip   Still doing her home exercises    Objective Findings: Left hip:  General: No gross deformity  ROM: flexion 120 degrees, extension 10, ER 60, IR 40, abduction 50, adduction 30  Palpation: +TTP at ASIS.  No TTP over greater trochanter, groin, pubis, gluteus max, gluteus min  Strength: 5/5 abduction, 5/5 adduction, 5/5 flexion, 5/5 extension  Special tests: Juan -, Fadir -, Labral shear -, Straight leg raise -  Neuro: Sensation intact and equal bilaterally in LE's   Pre-Existing Condition(s):     Assessment:   Condition Improved    Status: Discharged /  MMI  Permanent Disability:No    Plan:      Diagnostics:      Comments:       Disability Information   Status: Released to Full Duty    From:     Through:   Restrictions are:     Physical Restrictions   Sitting:    Standing:    Stooping:    Bending:      Squatting:    Walking:    Climbing:    Pushing:      Pulling:    Other:    Reaching Above Shoulder (L):   Reaching Above Shoulder (R):       Reaching Below Shoulder (L):    Reaching Below Shoulder (R):      Not to exceed Weight Limits   Carrying(hrs):   Weight  Limit(lb):   Lifting(hrs):   Weight  Limit(lb):     Comments:      Repetitive Actions   Hands: i.e. Fine Manipulations from Grasping:     Feet: i.e. Operating Foot Controls:     Driving / Operate Machinery:     Physician Name: Carolin Gibson M.D. Physician Signature: CAROLIN Peralta M.D. e-Signature: Dr. Troy Vasquez, Medical Director   Clinic Name / Location: [address] [address] Clinic Phone Number: Dept: [phone]   Appointment Time: 2:15 Pm Visit Start Time: 2:13 PM   Check-In Time:  2:12 Pm Visit Discharge Time: 2:39 PM   Original-Treating Physician or Chiropractor    Page 2-Insurer/TPA    Page 3-Employer    Page 4-Employee
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
## 2019-06-07 ASSESSMENT — ENCOUNTER SYMPTOMS
FEVER: 0
FOCAL WEAKNESS: 0
SHORTNESS OF BREATH: 0
SENSORY CHANGE: 0
SORE THROAT: 0

## 2022-06-19 ENCOUNTER — APPOINTMENT (OUTPATIENT)
Dept: RADIOLOGY | Facility: MEDICAL CENTER | Age: 37
DRG: 492 | End: 2022-06-19
Attending: EMERGENCY MEDICINE
Payer: COMMERCIAL

## 2022-06-19 ENCOUNTER — APPOINTMENT (OUTPATIENT)
Dept: RADIOLOGY | Facility: MEDICAL CENTER | Age: 37
DRG: 492 | End: 2022-06-19
Attending: ORTHOPAEDIC SURGERY
Payer: COMMERCIAL

## 2022-06-19 ENCOUNTER — HOSPITAL ENCOUNTER (INPATIENT)
Facility: MEDICAL CENTER | Age: 37
LOS: 3 days | DRG: 492 | End: 2022-06-22
Attending: EMERGENCY MEDICINE | Admitting: SURGERY
Payer: COMMERCIAL

## 2022-06-19 ENCOUNTER — APPOINTMENT (OUTPATIENT)
Dept: RADIOLOGY | Facility: MEDICAL CENTER | Age: 37
DRG: 492 | End: 2022-06-19
Attending: SURGERY
Payer: COMMERCIAL

## 2022-06-19 ENCOUNTER — ANESTHESIA (OUTPATIENT)
Dept: SURGERY | Facility: MEDICAL CENTER | Age: 37
DRG: 492 | End: 2022-06-19
Payer: COMMERCIAL

## 2022-06-19 ENCOUNTER — ANESTHESIA EVENT (OUTPATIENT)
Dept: SURGERY | Facility: MEDICAL CENTER | Age: 37
DRG: 492 | End: 2022-06-19
Payer: COMMERCIAL

## 2022-06-19 DIAGNOSIS — S82.401C TYPE III OPEN FRACTURE OF RIGHT TIBIA AND FIBULA, INITIAL ENCOUNTER: ICD-10-CM

## 2022-06-19 DIAGNOSIS — G89.18 ACUTE POST-OPERATIVE PAIN: ICD-10-CM

## 2022-06-19 DIAGNOSIS — S82.201C TYPE III OPEN FRACTURE OF RIGHT TIBIA AND FIBULA, INITIAL ENCOUNTER: ICD-10-CM

## 2022-06-19 PROBLEM — Z53.09 CONTRAINDICATION TO DEEP VEIN THROMBOSIS (DVT) PROPHYLAXIS: Status: ACTIVE | Noted: 2022-06-19

## 2022-06-19 PROBLEM — Z11.52 ENCOUNTER FOR SCREENING FOR COVID-19: Status: ACTIVE | Noted: 2022-06-19

## 2022-06-19 PROBLEM — T14.90XA TRAUMA: Status: ACTIVE | Noted: 2022-06-19

## 2022-06-19 PROBLEM — S82.201B TIBIA/FIBULA FRACTURE, RIGHT, OPEN TYPE I OR II, INITIAL ENCOUNTER: Status: ACTIVE | Noted: 2022-06-19

## 2022-06-19 PROBLEM — S82.401B TIBIA/FIBULA FRACTURE, RIGHT, OPEN TYPE I OR II, INITIAL ENCOUNTER: Status: ACTIVE | Noted: 2022-06-19

## 2022-06-19 LAB
ABO GROUP BLD: NORMAL
ALBUMIN SERPL BCP-MCNC: 3.4 G/DL (ref 3.2–4.9)
ALBUMIN/GLOB SERPL: 1.6 G/DL
ALP SERPL-CCNC: 50 U/L (ref 30–99)
ALT SERPL-CCNC: 21 U/L (ref 2–50)
ANION GAP SERPL CALC-SCNC: 10 MMOL/L (ref 7–16)
APTT PPP: 28.7 SEC (ref 24.7–36)
AST SERPL-CCNC: 28 U/L (ref 12–45)
BILIRUB SERPL-MCNC: 0.2 MG/DL (ref 0.1–1.5)
BLD GP AB SCN SERPL QL: NORMAL
BUN SERPL-MCNC: 5 MG/DL (ref 8–22)
CALCIUM SERPL-MCNC: 7.8 MG/DL (ref 8.5–10.5)
CFT BLD TEG: 5.6 MIN (ref 4.6–9.1)
CFT P HPASE BLD TEG: 6.1 MIN (ref 4.3–8.3)
CHLORIDE SERPL-SCNC: 108 MMOL/L (ref 96–112)
CLOT ANGLE BLD TEG: 72.2 DEGREES (ref 63–78)
CLOT LYSIS 30M P MA LENFR BLD TEG: 1.3 % (ref 0–2.6)
CO2 SERPL-SCNC: 20 MMOL/L (ref 20–33)
CREAT SERPL-MCNC: 0.68 MG/DL (ref 0.5–1.4)
CT.EXTRINSIC BLD ROTEM: 1.3 MIN (ref 0.8–2.1)
ERYTHROCYTE [DISTWIDTH] IN BLOOD BY AUTOMATED COUNT: 42.9 FL (ref 35.9–50)
ETHANOL BLD-MCNC: <10.1 MG/DL
GFR SERPLBLD CREATININE-BSD FMLA CKD-EPI: 116 ML/MIN/1.73 M 2
GLOBULIN SER CALC-MCNC: 2.1 G/DL (ref 1.9–3.5)
GLUCOSE SERPL-MCNC: 135 MG/DL (ref 65–99)
HCG SERPL QL: NEGATIVE
HCT VFR BLD AUTO: 41.3 % (ref 37–47)
HGB BLD-MCNC: 13.9 G/DL (ref 12–16)
INR PPP: 1.07 (ref 0.87–1.13)
MCF BLD TEG: 59.6 MM (ref 52–69)
MCF.PLATELET INHIB BLD ROTEM: 19.5 MM (ref 15–32)
MCH RBC QN AUTO: 31.1 PG (ref 27–33)
MCHC RBC AUTO-ENTMCNC: 33.7 G/DL (ref 33.6–35)
MCV RBC AUTO: 92.4 FL (ref 81.4–97.8)
PA AA BLD-ACNC: 10.7 % (ref 0–11)
PA ADP BLD-ACNC: NORMAL % (ref 0–17)
PLATELET # BLD AUTO: 300 K/UL (ref 164–446)
PMV BLD AUTO: 10.8 FL (ref 9–12.9)
POTASSIUM SERPL-SCNC: 3.1 MMOL/L (ref 3.6–5.5)
PROT SERPL-MCNC: 5.5 G/DL (ref 6–8.2)
PROTHROMBIN TIME: 13.6 SEC (ref 12–14.6)
RBC # BLD AUTO: 4.47 M/UL (ref 4.2–5.4)
RH BLD: NORMAL
SODIUM SERPL-SCNC: 138 MMOL/L (ref 135–145)
TEG ALGORITHM TGALG: NORMAL
WBC # BLD AUTO: 20.8 K/UL (ref 4.8–10.8)

## 2022-06-19 PROCEDURE — 86850 RBC ANTIBODY SCREEN: CPT

## 2022-06-19 PROCEDURE — 85384 FIBRINOGEN ACTIVITY: CPT

## 2022-06-19 PROCEDURE — 82077 ASSAY SPEC XCP UR&BREATH IA: CPT

## 2022-06-19 PROCEDURE — 3E0234Z INTRODUCTION OF SERUM, TOXOID AND VACCINE INTO MUSCLE, PERCUTANEOUS APPROACH: ICD-10-PCS | Performed by: EMERGENCY MEDICINE

## 2022-06-19 PROCEDURE — 160009 HCHG ANES TIME/MIN: Performed by: ORTHOPAEDIC SURGERY

## 2022-06-19 PROCEDURE — 72128 CT CHEST SPINE W/O DYE: CPT

## 2022-06-19 PROCEDURE — 700111 HCHG RX REV CODE 636 W/ 250 OVERRIDE (IP): Performed by: ANESTHESIOLOGY

## 2022-06-19 PROCEDURE — 700101 HCHG RX REV CODE 250: Performed by: ANESTHESIOLOGY

## 2022-06-19 PROCEDURE — 73590 X-RAY EXAM OF LOWER LEG: CPT | Mod: RT

## 2022-06-19 PROCEDURE — 160035 HCHG PACU - 1ST 60 MINS PHASE I: Performed by: ORTHOPAEDIC SURGERY

## 2022-06-19 PROCEDURE — 86900 BLOOD TYPING SEROLOGIC ABO: CPT

## 2022-06-19 PROCEDURE — 700105 HCHG RX REV CODE 258: Performed by: SURGERY

## 2022-06-19 PROCEDURE — 85027 COMPLETE CBC AUTOMATED: CPT

## 2022-06-19 PROCEDURE — 80053 COMPREHEN METABOLIC PANEL: CPT

## 2022-06-19 PROCEDURE — 700102 HCHG RX REV CODE 250 W/ 637 OVERRIDE(OP): Performed by: SURGERY

## 2022-06-19 PROCEDURE — 29515 APPLICATION SHORT LEG SPLINT: CPT

## 2022-06-19 PROCEDURE — A9270 NON-COVERED ITEM OR SERVICE: HCPCS | Performed by: ANESTHESIOLOGY

## 2022-06-19 PROCEDURE — 110371 HCHG SHELL REV 272: Performed by: ORTHOPAEDIC SURGERY

## 2022-06-19 PROCEDURE — 700105 HCHG RX REV CODE 258: Performed by: EMERGENCY MEDICINE

## 2022-06-19 PROCEDURE — G0390 TRAUMA RESPONS W/HOSP CRITI: HCPCS

## 2022-06-19 PROCEDURE — 160002 HCHG RECOVERY MINUTES (STAT): Performed by: ORTHOPAEDIC SURGERY

## 2022-06-19 PROCEDURE — 160048 HCHG OR STATISTICAL LEVEL 1-5: Performed by: ORTHOPAEDIC SURGERY

## 2022-06-19 PROCEDURE — A9270 NON-COVERED ITEM OR SERVICE: HCPCS | Performed by: SURGERY

## 2022-06-19 PROCEDURE — 700111 HCHG RX REV CODE 636 W/ 250 OVERRIDE (IP): Performed by: EMERGENCY MEDICINE

## 2022-06-19 PROCEDURE — 96375 TX/PRO/DX INJ NEW DRUG ADDON: CPT

## 2022-06-19 PROCEDURE — 72131 CT LUMBAR SPINE W/O DYE: CPT

## 2022-06-19 PROCEDURE — 72125 CT NECK SPINE W/O DYE: CPT

## 2022-06-19 PROCEDURE — 86901 BLOOD TYPING SEROLOGIC RH(D): CPT

## 2022-06-19 PROCEDURE — 85730 THROMBOPLASTIN TIME PARTIAL: CPT

## 2022-06-19 PROCEDURE — 99291 CRITICAL CARE FIRST HOUR: CPT

## 2022-06-19 PROCEDURE — 302874 HCHG BANDAGE ACE 2 OR 3""

## 2022-06-19 PROCEDURE — 27752 TREATMENT OF TIBIA FRACTURE: CPT

## 2022-06-19 PROCEDURE — 01490 ANES LWR LEG CST APP RMV/RPR: CPT | Performed by: ANESTHESIOLOGY

## 2022-06-19 PROCEDURE — 71260 CT THORAX DX C+: CPT

## 2022-06-19 PROCEDURE — 36415 COLL VENOUS BLD VENIPUNCTURE: CPT

## 2022-06-19 PROCEDURE — 90715 TDAP VACCINE 7 YRS/> IM: CPT | Performed by: EMERGENCY MEDICINE

## 2022-06-19 PROCEDURE — 99140 ANES COMP EMERGENCY COND: CPT | Performed by: ANESTHESIOLOGY

## 2022-06-19 PROCEDURE — 99222 1ST HOSP IP/OBS MODERATE 55: CPT | Performed by: SURGERY

## 2022-06-19 PROCEDURE — 90471 IMMUNIZATION ADMIN: CPT

## 2022-06-19 PROCEDURE — 11012 DEB SKIN BONE AT FX SITE: CPT | Performed by: ORTHOPAEDIC SURGERY

## 2022-06-19 PROCEDURE — 96374 THER/PROPH/DIAG INJ IV PUSH: CPT

## 2022-06-19 PROCEDURE — 770001 HCHG ROOM/CARE - MED/SURG/GYN PRIV*

## 2022-06-19 PROCEDURE — 85347 COAGULATION TIME ACTIVATED: CPT

## 2022-06-19 PROCEDURE — 70450 CT HEAD/BRAIN W/O DYE: CPT

## 2022-06-19 PROCEDURE — 304217 HCHG IRRIGATION SYSTEM

## 2022-06-19 PROCEDURE — 160028 HCHG SURGERY MINUTES - 1ST 30 MINS LEVEL 3: Performed by: ORTHOPAEDIC SURGERY

## 2022-06-19 PROCEDURE — 160039 HCHG SURGERY MINUTES - EA ADDL 1 MIN LEVEL 3: Performed by: ORTHOPAEDIC SURGERY

## 2022-06-19 PROCEDURE — C1713 ANCHOR/SCREW BN/BN,TIS/BN: HCPCS | Performed by: ORTHOPAEDIC SURGERY

## 2022-06-19 PROCEDURE — 700102 HCHG RX REV CODE 250 W/ 637 OVERRIDE(OP): Performed by: ANESTHESIOLOGY

## 2022-06-19 PROCEDURE — 72170 X-RAY EXAM OF PELVIS: CPT

## 2022-06-19 PROCEDURE — 85576 BLOOD PLATELET AGGREGATION: CPT | Mod: 91

## 2022-06-19 PROCEDURE — 85610 PROTHROMBIN TIME: CPT

## 2022-06-19 PROCEDURE — 700117 HCHG RX CONTRAST REV CODE 255: Performed by: EMERGENCY MEDICINE

## 2022-06-19 PROCEDURE — 71045 X-RAY EXAM CHEST 1 VIEW: CPT

## 2022-06-19 PROCEDURE — 700101 HCHG RX REV CODE 250: Performed by: EMERGENCY MEDICINE

## 2022-06-19 PROCEDURE — 0QSG35Z REPOSITION RIGHT TIBIA WITH EXTERNAL FIXATION DEVICE, PERCUTANEOUS APPROACH: ICD-10-PCS | Performed by: ORTHOPAEDIC SURGERY

## 2022-06-19 PROCEDURE — 99223 1ST HOSP IP/OBS HIGH 75: CPT | Mod: 57 | Performed by: ORTHOPAEDIC SURGERY

## 2022-06-19 PROCEDURE — 84703 CHORIONIC GONADOTROPIN ASSAY: CPT

## 2022-06-19 PROCEDURE — 20690 APPL UNIPLN UNI EXT FIXJ SYS: CPT | Mod: RT | Performed by: ORTHOPAEDIC SURGERY

## 2022-06-19 RX ORDER — HYDROMORPHONE HYDROCHLORIDE 1 MG/ML
0.2 INJECTION, SOLUTION INTRAMUSCULAR; INTRAVENOUS; SUBCUTANEOUS
Status: DISCONTINUED | OUTPATIENT
Start: 2022-06-19 | End: 2022-06-19 | Stop reason: HOSPADM

## 2022-06-19 RX ORDER — HALOPERIDOL 5 MG/ML
1 INJECTION INTRAMUSCULAR
Status: DISCONTINUED | OUTPATIENT
Start: 2022-06-19 | End: 2022-06-19 | Stop reason: HOSPADM

## 2022-06-19 RX ORDER — OXYCODONE HCL 5 MG/5 ML
10 SOLUTION, ORAL ORAL
Status: COMPLETED | OUTPATIENT
Start: 2022-06-19 | End: 2022-06-19

## 2022-06-19 RX ORDER — ONDANSETRON 4 MG/1
4 TABLET, ORALLY DISINTEGRATING ORAL EVERY 4 HOURS PRN
Status: DISCONTINUED | OUTPATIENT
Start: 2022-06-19 | End: 2022-06-22 | Stop reason: HOSPADM

## 2022-06-19 RX ORDER — SODIUM CHLORIDE, SODIUM LACTATE, POTASSIUM CHLORIDE, CALCIUM CHLORIDE 600; 310; 30; 20 MG/100ML; MG/100ML; MG/100ML; MG/100ML
INJECTION, SOLUTION INTRAVENOUS CONTINUOUS
Status: DISCONTINUED | OUTPATIENT
Start: 2022-06-19 | End: 2022-06-19 | Stop reason: HOSPADM

## 2022-06-19 RX ORDER — LIDOCAINE HYDROCHLORIDE 40 MG/ML
SOLUTION TOPICAL PRN
Status: DISCONTINUED | OUTPATIENT
Start: 2022-06-19 | End: 2022-06-19 | Stop reason: SURG

## 2022-06-19 RX ORDER — DOCUSATE SODIUM 100 MG/1
100 CAPSULE, LIQUID FILLED ORAL 2 TIMES DAILY
Status: DISCONTINUED | OUTPATIENT
Start: 2022-06-19 | End: 2022-06-22 | Stop reason: HOSPADM

## 2022-06-19 RX ORDER — AMOXICILLIN 250 MG
1 CAPSULE ORAL NIGHTLY
Status: DISCONTINUED | OUTPATIENT
Start: 2022-06-19 | End: 2022-06-22 | Stop reason: HOSPADM

## 2022-06-19 RX ORDER — MEPERIDINE HYDROCHLORIDE 25 MG/ML
6.25 INJECTION INTRAMUSCULAR; INTRAVENOUS; SUBCUTANEOUS
Status: DISCONTINUED | OUTPATIENT
Start: 2022-06-19 | End: 2022-06-19 | Stop reason: HOSPADM

## 2022-06-19 RX ORDER — CEFAZOLIN SODIUM 1 G/3ML
INJECTION, POWDER, FOR SOLUTION INTRAMUSCULAR; INTRAVENOUS PRN
Status: DISCONTINUED | OUTPATIENT
Start: 2022-06-19 | End: 2022-06-19 | Stop reason: SURG

## 2022-06-19 RX ORDER — ENEMA 19; 7 G/133ML; G/133ML
1 ENEMA RECTAL
Status: DISCONTINUED | OUTPATIENT
Start: 2022-06-19 | End: 2022-06-22 | Stop reason: HOSPADM

## 2022-06-19 RX ORDER — OXYCODONE HYDROCHLORIDE 5 MG/1
5 TABLET ORAL
Status: DISCONTINUED | OUTPATIENT
Start: 2022-06-19 | End: 2022-06-22 | Stop reason: HOSPADM

## 2022-06-19 RX ORDER — AMOXICILLIN 250 MG
1 CAPSULE ORAL
Status: DISCONTINUED | OUTPATIENT
Start: 2022-06-19 | End: 2022-06-22 | Stop reason: HOSPADM

## 2022-06-19 RX ORDER — HYDROMORPHONE HYDROCHLORIDE 1 MG/ML
0.4 INJECTION, SOLUTION INTRAMUSCULAR; INTRAVENOUS; SUBCUTANEOUS
Status: DISCONTINUED | OUTPATIENT
Start: 2022-06-19 | End: 2022-06-19 | Stop reason: HOSPADM

## 2022-06-19 RX ORDER — CELECOXIB 200 MG/1
200 CAPSULE ORAL 2 TIMES DAILY PRN
Status: DISCONTINUED | OUTPATIENT
Start: 2022-06-24 | End: 2022-06-22 | Stop reason: HOSPADM

## 2022-06-19 RX ORDER — MORPHINE SULFATE 4 MG/ML
4 INJECTION INTRAVENOUS ONCE
Status: COMPLETED | OUTPATIENT
Start: 2022-06-19 | End: 2022-06-19

## 2022-06-19 RX ORDER — SODIUM CHLORIDE, SODIUM LACTATE, POTASSIUM CHLORIDE, CALCIUM CHLORIDE 600; 310; 30; 20 MG/100ML; MG/100ML; MG/100ML; MG/100ML
INJECTION, SOLUTION INTRAVENOUS CONTINUOUS
Status: DISCONTINUED | OUTPATIENT
Start: 2022-06-19 | End: 2022-06-22 | Stop reason: HOSPADM

## 2022-06-19 RX ORDER — ACETAMINOPHEN 325 MG/1
650 TABLET ORAL EVERY 6 HOURS
Status: DISCONTINUED | OUTPATIENT
Start: 2022-06-19 | End: 2022-06-22 | Stop reason: HOSPADM

## 2022-06-19 RX ORDER — KETAMINE HYDROCHLORIDE 50 MG/ML
INJECTION, SOLUTION INTRAMUSCULAR; INTRAVENOUS
Status: COMPLETED | OUTPATIENT
Start: 2022-06-19 | End: 2022-06-19

## 2022-06-19 RX ORDER — HYDROMORPHONE HYDROCHLORIDE 1 MG/ML
0.1 INJECTION, SOLUTION INTRAMUSCULAR; INTRAVENOUS; SUBCUTANEOUS
Status: DISCONTINUED | OUTPATIENT
Start: 2022-06-19 | End: 2022-06-19 | Stop reason: HOSPADM

## 2022-06-19 RX ORDER — ACETAMINOPHEN 325 MG/1
650 TABLET ORAL EVERY 6 HOURS PRN
Status: DISCONTINUED | OUTPATIENT
Start: 2022-06-24 | End: 2022-06-22 | Stop reason: HOSPADM

## 2022-06-19 RX ORDER — CEFAZOLIN SODIUM 2 G/100ML
INJECTION, SOLUTION INTRAVENOUS
Status: COMPLETED | OUTPATIENT
Start: 2022-06-19 | End: 2022-06-19

## 2022-06-19 RX ORDER — MIDAZOLAM HYDROCHLORIDE 1 MG/ML
2 INJECTION INTRAMUSCULAR; INTRAVENOUS ONCE
Status: COMPLETED | OUTPATIENT
Start: 2022-06-19 | End: 2022-06-19

## 2022-06-19 RX ORDER — DEXAMETHASONE SODIUM PHOSPHATE 4 MG/ML
INJECTION, SOLUTION INTRA-ARTICULAR; INTRALESIONAL; INTRAMUSCULAR; INTRAVENOUS; SOFT TISSUE PRN
Status: DISCONTINUED | OUTPATIENT
Start: 2022-06-19 | End: 2022-06-19 | Stop reason: SURG

## 2022-06-19 RX ORDER — HYDROMORPHONE HYDROCHLORIDE 1 MG/ML
0.5 INJECTION, SOLUTION INTRAMUSCULAR; INTRAVENOUS; SUBCUTANEOUS
Status: DISCONTINUED | OUTPATIENT
Start: 2022-06-19 | End: 2022-06-22 | Stop reason: HOSPADM

## 2022-06-19 RX ORDER — SUCCINYLCHOLINE CHLORIDE 20 MG/ML
INJECTION INTRAMUSCULAR; INTRAVENOUS PRN
Status: DISCONTINUED | OUTPATIENT
Start: 2022-06-19 | End: 2022-06-19 | Stop reason: HOSPADM

## 2022-06-19 RX ORDER — ONDANSETRON 2 MG/ML
4 INJECTION INTRAMUSCULAR; INTRAVENOUS EVERY 4 HOURS PRN
Status: DISCONTINUED | OUTPATIENT
Start: 2022-06-19 | End: 2022-06-22 | Stop reason: HOSPADM

## 2022-06-19 RX ORDER — ONDANSETRON 2 MG/ML
4 INJECTION INTRAMUSCULAR; INTRAVENOUS
Status: DISCONTINUED | OUTPATIENT
Start: 2022-06-19 | End: 2022-06-19 | Stop reason: HOSPADM

## 2022-06-19 RX ORDER — CELECOXIB 200 MG/1
200 CAPSULE ORAL 2 TIMES DAILY
Status: DISCONTINUED | OUTPATIENT
Start: 2022-06-19 | End: 2022-06-22 | Stop reason: HOSPADM

## 2022-06-19 RX ORDER — POLYETHYLENE GLYCOL 3350 17 G/17G
1 POWDER, FOR SOLUTION ORAL 2 TIMES DAILY
Status: DISCONTINUED | OUTPATIENT
Start: 2022-06-19 | End: 2022-06-22 | Stop reason: HOSPADM

## 2022-06-19 RX ORDER — DIPHENHYDRAMINE HYDROCHLORIDE 50 MG/ML
12.5 INJECTION INTRAMUSCULAR; INTRAVENOUS
Status: DISCONTINUED | OUTPATIENT
Start: 2022-06-19 | End: 2022-06-19 | Stop reason: HOSPADM

## 2022-06-19 RX ORDER — ONDANSETRON 2 MG/ML
INJECTION INTRAMUSCULAR; INTRAVENOUS PRN
Status: DISCONTINUED | OUTPATIENT
Start: 2022-06-19 | End: 2022-06-19 | Stop reason: SURG

## 2022-06-19 RX ORDER — BISACODYL 10 MG
10 SUPPOSITORY, RECTAL RECTAL
Status: DISCONTINUED | OUTPATIENT
Start: 2022-06-19 | End: 2022-06-22 | Stop reason: HOSPADM

## 2022-06-19 RX ORDER — OXYCODONE HCL 5 MG/5 ML
5 SOLUTION, ORAL ORAL
Status: COMPLETED | OUTPATIENT
Start: 2022-06-19 | End: 2022-06-19

## 2022-06-19 RX ORDER — OXYCODONE HYDROCHLORIDE 10 MG/1
10 TABLET ORAL
Status: DISCONTINUED | OUTPATIENT
Start: 2022-06-19 | End: 2022-06-22 | Stop reason: HOSPADM

## 2022-06-19 RX ORDER — ALBUTEROL SULFATE 2.5 MG/3ML
2.5 SOLUTION RESPIRATORY (INHALATION)
Status: DISCONTINUED | OUTPATIENT
Start: 2022-06-19 | End: 2022-06-19 | Stop reason: HOSPADM

## 2022-06-19 RX ADMIN — MIDAZOLAM HYDROCHLORIDE 2 MG: 1 INJECTION, SOLUTION INTRAMUSCULAR; INTRAVENOUS at 16:35

## 2022-06-19 RX ADMIN — IOHEXOL 71 ML: 350 INJECTION, SOLUTION INTRAVENOUS at 16:48

## 2022-06-19 RX ADMIN — HYDROMORPHONE HYDROCHLORIDE 0.2 MG: 1 INJECTION, SOLUTION INTRAMUSCULAR; INTRAVENOUS; SUBCUTANEOUS at 19:25

## 2022-06-19 RX ADMIN — ONDANSETRON 4 MG: 2 INJECTION INTRAMUSCULAR; INTRAVENOUS at 18:33

## 2022-06-19 RX ADMIN — FENTANYL CITRATE 50 MCG: 50 INJECTION, SOLUTION INTRAMUSCULAR; INTRAVENOUS at 18:13

## 2022-06-19 RX ADMIN — HYDROMORPHONE HYDROCHLORIDE 0.2 MG: 1 INJECTION, SOLUTION INTRAMUSCULAR; INTRAVENOUS; SUBCUTANEOUS at 19:35

## 2022-06-19 RX ADMIN — LIDOCAINE HYDROCHLORIDE 3 ML: 40 SOLUTION TOPICAL at 18:04

## 2022-06-19 RX ADMIN — OXYCODONE HYDROCHLORIDE 10 MG: 5 SOLUTION ORAL at 19:05

## 2022-06-19 RX ADMIN — SENNOSIDES AND DOCUSATE SODIUM 1 TABLET: 50; 8.6 TABLET ORAL at 20:49

## 2022-06-19 RX ADMIN — FENTANYL CITRATE 50 MCG: 50 INJECTION, SOLUTION INTRAMUSCULAR; INTRAVENOUS at 19:16

## 2022-06-19 RX ADMIN — HYDROMORPHONE HYDROCHLORIDE 0.2 MG: 1 INJECTION, SOLUTION INTRAMUSCULAR; INTRAVENOUS; SUBCUTANEOUS at 19:30

## 2022-06-19 RX ADMIN — MORPHINE SULFATE 4 MG: 4 INJECTION INTRAVENOUS at 16:57

## 2022-06-19 RX ADMIN — CEFAZOLIN 2 G: 330 INJECTION, POWDER, FOR SOLUTION INTRAMUSCULAR; INTRAVENOUS at 18:06

## 2022-06-19 RX ADMIN — FENTANYL CITRATE 50 MCG: 50 INJECTION, SOLUTION INTRAMUSCULAR; INTRAVENOUS at 18:03

## 2022-06-19 RX ADMIN — SODIUM CHLORIDE, POTASSIUM CHLORIDE, SODIUM LACTATE AND CALCIUM CHLORIDE: 600; 310; 30; 20 INJECTION, SOLUTION INTRAVENOUS at 17:59

## 2022-06-19 RX ADMIN — ACETAMINOPHEN 650 MG: 325 TABLET ORAL at 20:48

## 2022-06-19 RX ADMIN — CLOSTRIDIUM TETANI TOXOID ANTIGEN (FORMALDEHYDE INACTIVATED), CORYNEBACTERIUM DIPHTHERIAE TOXOID ANTIGEN (FORMALDEHYDE INACTIVATED), BORDETELLA PERTUSSIS TOXOID ANTIGEN (GLUTARALDEHYDE INACTIVATED), BORDETELLA PERTUSSIS FILAMENTOUS HEMAGGLUTININ ANTIGEN (FORMALDEHYDE INACTIVATED), BORDETELLA PERTUSSIS PERTACTIN ANTIGEN, AND BORDETELLA PERTUSSIS FIMBRIAE 2/3 ANTIGEN 0.5 ML: 5; 2; 2.5; 5; 3; 5 INJECTION, SUSPENSION INTRAMUSCULAR at 16:15

## 2022-06-19 RX ADMIN — FENTANYL CITRATE 50 MCG: 50 INJECTION, SOLUTION INTRAMUSCULAR; INTRAVENOUS at 19:10

## 2022-06-19 RX ADMIN — CEFAZOLIN SODIUM 2 G: 2 INJECTION, SOLUTION INTRAVENOUS at 16:05

## 2022-06-19 RX ADMIN — PROPOFOL 150 MG: 10 INJECTION, EMULSION INTRAVENOUS at 18:03

## 2022-06-19 RX ADMIN — FENTANYL CITRATE 50 MCG: 50 INJECTION, SOLUTION INTRAMUSCULAR; INTRAVENOUS at 18:20

## 2022-06-19 RX ADMIN — OXYCODONE HYDROCHLORIDE 10 MG: 10 TABLET ORAL at 23:56

## 2022-06-19 RX ADMIN — GENTAMICIN SULFATE 280 MG: 40 INJECTION, SOLUTION INTRAMUSCULAR; INTRAVENOUS at 16:18

## 2022-06-19 RX ADMIN — CELECOXIB 200 MG: 200 CAPSULE ORAL at 20:52

## 2022-06-19 RX ADMIN — DEXAMETHASONE SODIUM PHOSPHATE 4 MG: 4 INJECTION, SOLUTION INTRA-ARTICULAR; INTRALESIONAL; INTRAMUSCULAR; INTRAVENOUS; SOFT TISSUE at 18:08

## 2022-06-19 RX ADMIN — SUCCINYLCHOLINE CHLORIDE 60 MG: 20 INJECTION, SOLUTION INTRAMUSCULAR; INTRAVENOUS; PARENTERAL at 18:03

## 2022-06-19 RX ADMIN — HYDROMORPHONE HYDROCHLORIDE 0.2 MG: 1 INJECTION, SOLUTION INTRAMUSCULAR; INTRAVENOUS; SUBCUTANEOUS at 19:40

## 2022-06-19 RX ADMIN — KETAMINE HYDROCHLORIDE 50 MG: 50 INJECTION INTRAMUSCULAR; INTRAVENOUS at 16:06

## 2022-06-19 RX ADMIN — DOCUSATE SODIUM 100 MG: 100 CAPSULE, LIQUID FILLED ORAL at 20:54

## 2022-06-19 RX ADMIN — FENTANYL CITRATE 50 MCG: 50 INJECTION, SOLUTION INTRAMUSCULAR; INTRAVENOUS at 18:35

## 2022-06-19 RX ADMIN — OXYCODONE HYDROCHLORIDE 10 MG: 10 TABLET ORAL at 20:48

## 2022-06-19 RX ADMIN — FENTANYL CITRATE 50 MCG: 50 INJECTION, SOLUTION INTRAMUSCULAR; INTRAVENOUS at 18:42

## 2022-06-19 RX ADMIN — PROPOFOL 100 MG: 10 INJECTION, EMULSION INTRAVENOUS at 16:05

## 2022-06-19 RX ADMIN — HYDROMORPHONE HYDROCHLORIDE 0.2 MG: 1 INJECTION, SOLUTION INTRAMUSCULAR; INTRAVENOUS; SUBCUTANEOUS at 19:20

## 2022-06-19 ASSESSMENT — COPD QUESTIONNAIRES
DURING THE PAST 4 WEEKS HOW MUCH DID YOU FEEL SHORT OF BREATH: NONE/LITTLE OF THE TIME
DO YOU EVER COUGH UP ANY MUCUS OR PHLEGM?: YES, A FEW DAYS A WEEK OR MONTH
HAVE YOU SMOKED AT LEAST 100 CIGARETTES IN YOUR ENTIRE LIFE: YES
COPD SCREENING SCORE: 3

## 2022-06-19 ASSESSMENT — PAIN DESCRIPTION - PAIN TYPE
TYPE: SURGICAL PAIN

## 2022-06-19 ASSESSMENT — PAIN SCALES - GENERAL: PAIN_LEVEL: 0
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
## 2022-06-19 NOTE — ASSESSMENT & PLAN NOTE
Comminuted intra-articular distal tibia and fibular fractures with lateral displacement, rotation and soft tissue gas indicating open injury.  2 grams Rocephin given by [facility]. .  6/19 Irrigation debridement open fracture, external fixation of right ankle.   6/20 Plan for ORIF of right ankle  Weight bearing status - Nonweightbearing RLE.  Chidi Sterling MD. Orthopedic Surgeon. [facility].
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
## 2022-06-20 ENCOUNTER — ANESTHESIA EVENT (OUTPATIENT)
Dept: SURGERY | Facility: MEDICAL CENTER | Age: 37
DRG: 492 | End: 2022-06-20
Payer: COMMERCIAL

## 2022-06-20 ENCOUNTER — APPOINTMENT (OUTPATIENT)
Dept: RADIOLOGY | Facility: MEDICAL CENTER | Age: 37
DRG: 492 | End: 2022-06-20
Attending: ORTHOPAEDIC SURGERY
Payer: COMMERCIAL

## 2022-06-20 ENCOUNTER — ANESTHESIA (OUTPATIENT)
Dept: SURGERY | Facility: MEDICAL CENTER | Age: 37
DRG: 492 | End: 2022-06-20
Payer: COMMERCIAL

## 2022-06-20 PROCEDURE — 700102 HCHG RX REV CODE 250 W/ 637 OVERRIDE(OP): Performed by: SURGERY

## 2022-06-20 PROCEDURE — 01480 ANES OPEN PX LOWER L/A/F NOS: CPT | Performed by: ANESTHESIOLOGY

## 2022-06-20 PROCEDURE — 64445 NJX AA&/STRD SCIATIC NRV IMG: CPT | Performed by: ORTHOPAEDIC SURGERY

## 2022-06-20 PROCEDURE — 160029 HCHG SURGERY MINUTES - 1ST 30 MINS LEVEL 4: Performed by: ORTHOPAEDIC SURGERY

## 2022-06-20 PROCEDURE — 27828 TREAT LOWER LEG FRACTURE: CPT | Mod: 58,RT | Performed by: ORTHOPAEDIC SURGERY

## 2022-06-20 PROCEDURE — A9270 NON-COVERED ITEM OR SERVICE: HCPCS | Performed by: SURGERY

## 2022-06-20 PROCEDURE — 160009 HCHG ANES TIME/MIN: Performed by: ORTHOPAEDIC SURGERY

## 2022-06-20 PROCEDURE — 0QSG04Z REPOSITION RIGHT TIBIA WITH INTERNAL FIXATION DEVICE, OPEN APPROACH: ICD-10-PCS | Performed by: ORTHOPAEDIC SURGERY

## 2022-06-20 PROCEDURE — 27828 TREAT LOWER LEG FRACTURE: CPT | Mod: ASROC,58,RT | Performed by: PHYSICIAN ASSISTANT

## 2022-06-20 PROCEDURE — 99233 SBSQ HOSP IP/OBS HIGH 50: CPT | Performed by: NURSE PRACTITIONER

## 2022-06-20 PROCEDURE — 160048 HCHG OR STATISTICAL LEVEL 1-5: Performed by: ORTHOPAEDIC SURGERY

## 2022-06-20 PROCEDURE — 64447 NJX AA&/STRD FEMORAL NRV IMG: CPT | Performed by: ORTHOPAEDIC SURGERY

## 2022-06-20 PROCEDURE — 160002 HCHG RECOVERY MINUTES (STAT): Performed by: ORTHOPAEDIC SURGERY

## 2022-06-20 PROCEDURE — 20694 RMVL EXT FIXJ SYS UNDER ANES: CPT | Mod: ASROC,58 | Performed by: PHYSICIAN ASSISTANT

## 2022-06-20 PROCEDURE — 700111 HCHG RX REV CODE 636 W/ 250 OVERRIDE (IP): Performed by: ANESTHESIOLOGY

## 2022-06-20 PROCEDURE — 73600 X-RAY EXAM OF ANKLE: CPT | Mod: RT

## 2022-06-20 PROCEDURE — 160041 HCHG SURGERY MINUTES - EA ADDL 1 MIN LEVEL 4: Performed by: ORTHOPAEDIC SURGERY

## 2022-06-20 PROCEDURE — C1713 ANCHOR/SCREW BN/BN,TIS/BN: HCPCS | Performed by: ORTHOPAEDIC SURGERY

## 2022-06-20 PROCEDURE — 20694 RMVL EXT FIXJ SYS UNDER ANES: CPT | Mod: 58 | Performed by: ORTHOPAEDIC SURGERY

## 2022-06-20 PROCEDURE — 700101 HCHG RX REV CODE 250: Performed by: ORTHOPAEDIC SURGERY

## 2022-06-20 PROCEDURE — 160035 HCHG PACU - 1ST 60 MINS PHASE I: Performed by: ORTHOPAEDIC SURGERY

## 2022-06-20 PROCEDURE — 770001 HCHG ROOM/CARE - MED/SURG/GYN PRIV*

## 2022-06-20 PROCEDURE — 700111 HCHG RX REV CODE 636 W/ 250 OVERRIDE (IP): Performed by: SURGERY

## 2022-06-20 PROCEDURE — 700101 HCHG RX REV CODE 250: Performed by: ANESTHESIOLOGY

## 2022-06-20 DEVICE — SCREW 2.5 MM LOCKING TI X 32MM LONG (6TX8=48): Type: IMPLANTABLE DEVICE | Site: ANKLE | Status: FUNCTIONAL

## 2022-06-20 DEVICE — SCREW 3.5 MM NON-LOCKING TI X 10MM LONG (6TX8+2TX5=58): Type: IMPLANTABLE DEVICE | Site: ANKLE | Status: FUNCTIONAL

## 2022-06-20 DEVICE — SCREW 2.5 MM NON-LOCKING TI X 34MM LONG (6TX8=48): Type: IMPLANTABLE DEVICE | Site: ANKLE | Status: FUNCTIONAL

## 2022-06-20 DEVICE — SCREW 3.5 MM LOCKING TI X 26MM LONG (6TX8=48): Type: IMPLANTABLE DEVICE | Site: ANKLE | Status: FUNCTIONAL

## 2022-06-20 DEVICE — IMPLANTABLE DEVICE: Type: IMPLANTABLE DEVICE | Site: ANKLE | Status: FUNCTIONAL

## 2022-06-20 DEVICE — SCREW 3.5 MM NON-LOCKING TI X 26MM LONG (6TX8=48): Type: IMPLANTABLE DEVICE | Site: ANKLE | Status: FUNCTIONAL

## 2022-06-20 DEVICE — SCREW 2.5 MM LOCKING TI X 40MM LONG (6TX8=48): Type: IMPLANTABLE DEVICE | Site: ANKLE | Status: FUNCTIONAL

## 2022-06-20 DEVICE — SCREW 3.5 MM LOCKING TI X 10MM LONG (6TX8+2TX5=58): Type: IMPLANTABLE DEVICE | Site: ANKLE | Status: FUNCTIONAL

## 2022-06-20 DEVICE — SCREW 2.5 MM LOCKING TI X 22MM LONG (6TX8=48): Type: IMPLANTABLE DEVICE | Site: ANKLE | Status: FUNCTIONAL

## 2022-06-20 DEVICE — SCREW 2.5 MM LOCKING TI X 34MM LONG (6TX8=48): Type: IMPLANTABLE DEVICE | Site: ANKLE | Status: FUNCTIONAL

## 2022-06-20 DEVICE — PLATE LOCKING 1/3 TUBULAR 7H (6TX2=12): Type: IMPLANTABLE DEVICE | Site: ANKLE | Status: FUNCTIONAL

## 2022-06-20 DEVICE — SCREW 3.5 MM NON-LOCKING TI X 24MM LONG (6TX8=48): Type: IMPLANTABLE DEVICE | Site: ANKLE | Status: FUNCTIONAL

## 2022-06-20 RX ORDER — HYDROMORPHONE HYDROCHLORIDE 1 MG/ML
0.1 INJECTION, SOLUTION INTRAMUSCULAR; INTRAVENOUS; SUBCUTANEOUS
Status: DISCONTINUED | OUTPATIENT
Start: 2022-06-20 | End: 2022-06-20 | Stop reason: HOSPADM

## 2022-06-20 RX ORDER — ONDANSETRON 2 MG/ML
INJECTION INTRAMUSCULAR; INTRAVENOUS PRN
Status: DISCONTINUED | OUTPATIENT
Start: 2022-06-20 | End: 2022-06-20 | Stop reason: SURG

## 2022-06-20 RX ORDER — OXYCODONE HCL 5 MG/5 ML
5 SOLUTION, ORAL ORAL
Status: DISCONTINUED | OUTPATIENT
Start: 2022-06-20 | End: 2022-06-20 | Stop reason: HOSPADM

## 2022-06-20 RX ORDER — HYDROMORPHONE HYDROCHLORIDE 1 MG/ML
0.2 INJECTION, SOLUTION INTRAMUSCULAR; INTRAVENOUS; SUBCUTANEOUS
Status: DISCONTINUED | OUTPATIENT
Start: 2022-06-20 | End: 2022-06-20 | Stop reason: HOSPADM

## 2022-06-20 RX ORDER — MIDAZOLAM HYDROCHLORIDE 1 MG/ML
INJECTION INTRAMUSCULAR; INTRAVENOUS PRN
Status: DISCONTINUED | OUTPATIENT
Start: 2022-06-20 | End: 2022-06-20 | Stop reason: SURG

## 2022-06-20 RX ORDER — METOPROLOL TARTRATE 1 MG/ML
1 INJECTION, SOLUTION INTRAVENOUS
Status: DISCONTINUED | OUTPATIENT
Start: 2022-06-20 | End: 2022-06-20 | Stop reason: HOSPADM

## 2022-06-20 RX ORDER — HYDRALAZINE HYDROCHLORIDE 20 MG/ML
5 INJECTION INTRAMUSCULAR; INTRAVENOUS
Status: DISCONTINUED | OUTPATIENT
Start: 2022-06-20 | End: 2022-06-20 | Stop reason: HOSPADM

## 2022-06-20 RX ORDER — HALOPERIDOL 5 MG/ML
1 INJECTION INTRAMUSCULAR
Status: DISCONTINUED | OUTPATIENT
Start: 2022-06-20 | End: 2022-06-20 | Stop reason: HOSPADM

## 2022-06-20 RX ORDER — ALBUTEROL SULFATE 2.5 MG/3ML
2.5 SOLUTION RESPIRATORY (INHALATION)
Status: DISCONTINUED | OUTPATIENT
Start: 2022-06-20 | End: 2022-06-20 | Stop reason: HOSPADM

## 2022-06-20 RX ORDER — LORAZEPAM 2 MG/ML
0.5 INJECTION INTRAMUSCULAR
Status: DISCONTINUED | OUTPATIENT
Start: 2022-06-20 | End: 2022-06-20 | Stop reason: HOSPADM

## 2022-06-20 RX ORDER — ONDANSETRON 2 MG/ML
4 INJECTION INTRAMUSCULAR; INTRAVENOUS
Status: DISCONTINUED | OUTPATIENT
Start: 2022-06-20 | End: 2022-06-20 | Stop reason: HOSPADM

## 2022-06-20 RX ORDER — DIPHENHYDRAMINE HYDROCHLORIDE 50 MG/ML
12.5 INJECTION INTRAMUSCULAR; INTRAVENOUS
Status: DISCONTINUED | OUTPATIENT
Start: 2022-06-20 | End: 2022-06-20 | Stop reason: HOSPADM

## 2022-06-20 RX ORDER — KETOROLAC TROMETHAMINE 30 MG/ML
INJECTION, SOLUTION INTRAMUSCULAR; INTRAVENOUS PRN
Status: DISCONTINUED | OUTPATIENT
Start: 2022-06-20 | End: 2022-06-20 | Stop reason: SURG

## 2022-06-20 RX ORDER — DEXAMETHASONE SODIUM PHOSPHATE 4 MG/ML
INJECTION, SOLUTION INTRA-ARTICULAR; INTRALESIONAL; INTRAMUSCULAR; INTRAVENOUS; SOFT TISSUE PRN
Status: DISCONTINUED | OUTPATIENT
Start: 2022-06-20 | End: 2022-06-20 | Stop reason: SURG

## 2022-06-20 RX ORDER — HYDROMORPHONE HYDROCHLORIDE 1 MG/ML
0.4 INJECTION, SOLUTION INTRAMUSCULAR; INTRAVENOUS; SUBCUTANEOUS
Status: DISCONTINUED | OUTPATIENT
Start: 2022-06-20 | End: 2022-06-20 | Stop reason: HOSPADM

## 2022-06-20 RX ORDER — OXYCODONE HCL 5 MG/5 ML
10 SOLUTION, ORAL ORAL
Status: DISCONTINUED | OUTPATIENT
Start: 2022-06-20 | End: 2022-06-20 | Stop reason: HOSPADM

## 2022-06-20 RX ORDER — MAGNESIUM HYDROXIDE 1200 MG/15ML
LIQUID ORAL
Status: COMPLETED | OUTPATIENT
Start: 2022-06-20 | End: 2022-06-20

## 2022-06-20 RX ORDER — MEPERIDINE HYDROCHLORIDE 25 MG/ML
6.25 INJECTION INTRAMUSCULAR; INTRAVENOUS; SUBCUTANEOUS
Status: DISCONTINUED | OUTPATIENT
Start: 2022-06-20 | End: 2022-06-20 | Stop reason: HOSPADM

## 2022-06-20 RX ORDER — BUPIVACAINE HYDROCHLORIDE 5 MG/ML
INJECTION, SOLUTION EPIDURAL; INTRACAUDAL PRN
Status: DISCONTINUED | OUTPATIENT
Start: 2022-06-20 | End: 2022-06-20 | Stop reason: SURG

## 2022-06-20 RX ORDER — CEFAZOLIN SODIUM 1 G/3ML
INJECTION, POWDER, FOR SOLUTION INTRAMUSCULAR; INTRAVENOUS PRN
Status: DISCONTINUED | OUTPATIENT
Start: 2022-06-20 | End: 2022-06-20 | Stop reason: SURG

## 2022-06-20 RX ADMIN — CELECOXIB 200 MG: 200 CAPSULE ORAL at 17:08

## 2022-06-20 RX ADMIN — FENTANYL CITRATE 50 MCG: 50 INJECTION, SOLUTION INTRAMUSCULAR; INTRAVENOUS at 11:33

## 2022-06-20 RX ADMIN — PROPOFOL 200 MG: 10 INJECTION, EMULSION INTRAVENOUS at 10:18

## 2022-06-20 RX ADMIN — MIDAZOLAM HYDROCHLORIDE 2 MG: 1 INJECTION, SOLUTION INTRAMUSCULAR; INTRAVENOUS at 10:14

## 2022-06-20 RX ADMIN — FENTANYL CITRATE 25 MCG: 50 INJECTION, SOLUTION INTRAMUSCULAR; INTRAVENOUS at 12:17

## 2022-06-20 RX ADMIN — OXYCODONE HYDROCHLORIDE 10 MG: 10 TABLET ORAL at 05:20

## 2022-06-20 RX ADMIN — KETOROLAC TROMETHAMINE 30 MG: 30 INJECTION, SOLUTION INTRAMUSCULAR at 11:06

## 2022-06-20 RX ADMIN — SENNOSIDES AND DOCUSATE SODIUM 1 TABLET: 50; 8.6 TABLET ORAL at 20:58

## 2022-06-20 RX ADMIN — OXYCODONE HYDROCHLORIDE 10 MG: 10 TABLET ORAL at 17:07

## 2022-06-20 RX ADMIN — OXYCODONE HYDROCHLORIDE 10 MG: 10 TABLET ORAL at 20:58

## 2022-06-20 RX ADMIN — HYDROMORPHONE HYDROCHLORIDE 0.5 MG: 1 INJECTION, SOLUTION INTRAMUSCULAR; INTRAVENOUS; SUBCUTANEOUS at 08:00

## 2022-06-20 RX ADMIN — ACETAMINOPHEN 650 MG: 325 TABLET ORAL at 17:07

## 2022-06-20 RX ADMIN — CEFAZOLIN 2 G: 330 INJECTION, POWDER, FOR SOLUTION INTRAMUSCULAR; INTRAVENOUS at 10:18

## 2022-06-20 RX ADMIN — DOCUSATE SODIUM 100 MG: 100 CAPSULE, LIQUID FILLED ORAL at 05:21

## 2022-06-20 RX ADMIN — FENTANYL CITRATE 25 MCG: 50 INJECTION, SOLUTION INTRAMUSCULAR; INTRAVENOUS at 11:59

## 2022-06-20 RX ADMIN — CELECOXIB 200 MG: 200 CAPSULE ORAL at 05:20

## 2022-06-20 RX ADMIN — BUPIVACAINE HYDROCHLORIDE 25 ML: 5 INJECTION, SOLUTION EPIDURAL; INTRACAUDAL; PERINEURAL at 10:28

## 2022-06-20 RX ADMIN — ACETAMINOPHEN 650 MG: 325 TABLET ORAL at 05:20

## 2022-06-20 RX ADMIN — DEXAMETHASONE SODIUM PHOSPHATE 10 MG: 4 INJECTION, SOLUTION INTRA-ARTICULAR; INTRALESIONAL; INTRAMUSCULAR; INTRAVENOUS; SOFT TISSUE at 10:25

## 2022-06-20 RX ADMIN — ONDANSETRON 4 MG: 2 INJECTION INTRAMUSCULAR; INTRAVENOUS at 11:06

## 2022-06-20 ASSESSMENT — ENCOUNTER SYMPTOMS
CARDIOVASCULAR NEGATIVE: 1
MYALGIAS: 1
GASTROINTESTINAL NEGATIVE: 1
RESPIRATORY NEGATIVE: 1
EYES NEGATIVE: 1
FEVER: 0

## 2022-06-20 ASSESSMENT — PATIENT HEALTH QUESTIONNAIRE - PHQ9
1. LITTLE INTEREST OR PLEASURE IN DOING THINGS: NOT AT ALL
SUM OF ALL RESPONSES TO PHQ9 QUESTIONS 1 AND 2: 0
2. FEELING DOWN, DEPRESSED, IRRITABLE, OR HOPELESS: NOT AT ALL

## 2022-06-20 ASSESSMENT — PAIN SCALES - GENERAL: PAIN_LEVEL: 0

## 2022-06-20 ASSESSMENT — PAIN DESCRIPTION - PAIN TYPE
TYPE: SURGICAL PAIN

## 2022-06-20 ASSESSMENT — LIFESTYLE VARIABLES
EVER HAD A DRINK FIRST THING IN THE MORNING TO STEADY YOUR NERVES TO GET RID OF A HANGOVER: NO
ALCOHOL_USE: YES
ON A TYPICAL DAY WHEN YOU DRINK ALCOHOL HOW MANY DRINKS DO YOU HAVE: 0
CONSUMPTION TOTAL: POSITIVE
EVER FELT BAD OR GUILTY ABOUT YOUR DRINKING: YES
DOES PATIENT WANT TO STOP DRINKING: NO
HAVE YOU EVER FELT YOU SHOULD CUT DOWN ON YOUR DRINKING: NO
TOTAL SCORE: 2
HOW MANY TIMES IN THE PAST YEAR HAVE YOU HAD 5 OR MORE DRINKS IN A DAY: 0
AVERAGE NUMBER OF DAYS PER WEEK YOU HAVE A DRINK CONTAINING ALCOHOL: 1
HAVE PEOPLE ANNOYED YOU BY CRITICIZING YOUR DRINKING: YES

## 2022-06-20 ASSESSMENT — FIBROSIS 4 INDEX: FIB4 SCORE: 0.73

## 2022-06-20 NOTE — ANESTHESIA POSTPROCEDURE EVALUATION
Patient: Josue Seventy-[age]    Procedure Summary     Date: 06/19/22 Room / Location: [place] [address] / SURGERY [facility]    Anesthesia Start: 1759 Anesthesia Stop:     Procedure: APPLICATION, EXTERNAL FIXATION DEVICE (Right Leg Lower) Diagnosis: (RIGHT ANKLE FRACTURE)    Surgeons: Chidi Sterling M.D. Responsible Provider: Ayaka Dahl M.D.    Anesthesia Type: general ASA Status: 2 - Emergent          Final Anesthesia Type: general  Last vitals  BP   120/54   Temp   98.2   Pulse   81   Resp   20    SpO2   100%     Anesthesia Post Evaluation    Patient location during evaluation: PACU  Patient participation: complete - patient participated  Level of consciousness: sleepy but conscious  Pain score: 0    Airway patency: patent  Anesthetic complications: no  Cardiovascular status: hemodynamically stable  Respiratory status: acceptable  Hydration status: euvolemic    PONV: none          No complications documented.

## 2022-06-20 NOTE — ANESTHESIA PROCEDURE NOTES
Airway    Date/Time: 6/20/2022 10:19 AM  Performed by: Ling Christopher M.D.  Authorized by: Ling Christopher M.D.     Location:  OR  Urgency:  Elective  Indications for Airway Management:  Anesthesia      Spontaneous Ventilation: absent    Sedation Level:  Deep  Preoxygenated: Yes    Mask Difficulty Assessment:  0 - not attempted  Final Airway Type:  Supraglottic airway  Final Supraglottic Airway:  Standard LMA    SGA Size:  3  Number of Attempts at Approach:  1
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
## 2022-06-20 NOTE — PROGRESS NOTES
4 Eyes Skin Assessment Completed by PAUL Torres and PAUL Mas.    Head WDL  Ears WDL  Nose WDL  Mouth WDL  Neck WDL  Breast/Chest WDL  Shoulder Blades WDL  Spine WDL  (R) Arm/Elbow/Hand Abrasions  (L) Arm/Elbow/Hand Abrasions  Abdomen WDL  Groin WDL  Scrotum/Coccyx/Buttocks WDL  (R) Leg Edema,Exfix  (L) Leg WDL  (R) Heel/Foot/Toe WDL  (L) Heel/Foot/Toe WDL          Devices In Places Pulse Ox, SCD's and Leg Immobilizer      Interventions In Place Pillows, Heels Loaded W/Pillows and Pressure Redistribution Mattress    Possible Skin Injury No    Pictures Uploaded Into Epic N/A  Wound Consult Placed N/A  RN Wound Prevention Protocol Ordered No

## 2022-06-20 NOTE — ANESTHESIA PROCEDURE NOTES
Peripheral Block    Date/Time: 6/20/2022 10:22 AM  Performed by: Ling Christopher M.D.  Authorized by: Ling Christopher M.D.     Patient Location:  OR  Start Time:  6/20/2022 10:22 AM  Reason for Block: at surgeon's request and post-op pain management ONLY    patient identified, IV checked, site marked, risks and benefits discussed, surgical consent, monitors and equipment checked, pre-op evaluation and timeout performed    Patient Position:  Supine  Prep: ChloraPrep    Monitoring:  Heart rate, continuous pulse ox and cardiac monitor  Block Region:  Lower Extremity  Lower Extremity - Block Type:  SCIATIC nerve block, lateral approach    Laterality:  Right  Procedures: ultrasound guided  Image captured, interpreted and electronically stored.  Local Infiltration:  Lidocaine  Strength:  1 %  Dose:  3 ml  Block Type:  Single-shot  Needle Length:  100mm  Needle Gauge:  21 G  Needle Localization:  Ultrasound guidance  Injection Assessment:  Negative aspiration for heme, no paresthesia on injection, incremental injection and local visualized surrounding nerve on ultrasound  Evidence of intravascular injection: No
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
## 2022-06-20 NOTE — PROGRESS NOTES
Pt back to the unit from PACU. VSS on room air. Surgical dressing to RLE CDI. Pt denies pain. Toes pink and warm, cap refill <3 seconds. Pt updated on POC. Family at bedside.
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
## 2022-06-20 NOTE — ANESTHESIA POSTPROCEDURE EVALUATION
Patient: Chelsea Ramos    Procedure Summary     Date: 06/20/22 Room / Location: [place] [address] / SURGERY [facility]    Anesthesia Start: 1014 Anesthesia Stop: 1124    Procedure: ORIF, ANKLE- RIGHT PILION ORIF (Right Ankle) Diagnosis: (Right tibia pilion fracture)    Surgeons: Chidi Sterling M.D. Responsible Provider: Ling Christopher M.D.    Anesthesia Type: general, peripheral nerve block ASA Status: 1          Final Anesthesia Type: general, peripheral nerve block  Last vitals  BP   Blood Pressure: 137/89    Temp   36.2 °C (97.2 °F)    Pulse   87   Resp   16    SpO2   94 %      Anesthesia Post Evaluation    Patient location during evaluation: PACU  Patient participation: complete - patient participated  Level of consciousness: awake and alert  Pain score: 0    Airway patency: patent  Anesthetic complications: no  Cardiovascular status: adequate  Respiratory status: acceptable  Hydration status: acceptable    PONV: none          No complications documented.     Nurse Pain Score: 0 (NPRS)

## 2022-06-20 NOTE — ANESTHESIA PROCEDURE NOTES
Airway    Date/Time: 6/19/2022 6:04 PM  Performed by: Ayaka Dahl M.D.  Authorized by: Ayaka Dahl M.D.     Location:  OR  Urgency:  Elective  Difficult Airway: No    Indications for Airway Management:  Anesthesia      Spontaneous Ventilation: absent    Sedation Level:  Deep  Preoxygenated: Yes    Patient Position:  Sniffing  MILS Maintained Throughout: Yes    Mask Difficulty Assessment:  0 - not attempted  Final Airway Type:  Endotracheal airway  Final Endotracheal Airway:  ETT  Cuffed: Yes    Technique Used for Successful ETT Placement:  Direct laryngoscopy  Devices/Methods Used in Placement:  Intubating stylet and cricoid pressure    Insertion Site:  Oral  Blade Type:  Selvin  Laryngoscope Blade/Videolaryngoscope Blade Size:  3  ETT Size (mm):  6.5  Measured from:  Teeth  ETT to Teeth (cm):  20  Placement Verified by: auscultation and capnometry    Cormack-Lehane Classification:  Grade I - full view of glottis  Number of Attempts at Approach:  1
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
## 2022-06-20 NOTE — PROGRESS NOTES
Patient to floor with transport in stable condition. VSS. Surgical dressing clean dry intact. Aox4. No belongings. Family updated. No further needs.

## 2022-06-20 NOTE — ANESTHESIA PROCEDURE NOTES
Peripheral Block    Date/Time: 6/20/2022 10:22 AM  Performed by: Ling Christopher M.D.  Authorized by: Ling Christopher M.D.     Patient Location:  OR  Start Time:  6/20/2022 10:22 AM  Reason for Block: at surgeon's request and post-op pain management ONLY    patient identified, IV checked, site marked, risks and benefits discussed, surgical consent, monitors and equipment checked, pre-op evaluation and timeout performed    Patient Position:  Supine  Prep: ChloraPrep    Monitoring:  Heart rate, continuous pulse ox and cardiac monitor  Block Region:  Lower Extremity  Lower Extremity - Block Type:  Selective FEMORAL nerve block at the Adductor Canal    Laterality:  Right  Procedures: ultrasound guided  Image captured, interpreted and electronically stored.  Local Infiltration:  Lidocaine  Strength:  1 %  Dose:  3 ml  Block Type:  Single-shot  Needle Length:  100mm  Needle Gauge:  21 G  Needle Localization:  Ultrasound guidance  Injection Assessment:  Negative aspiration for heme, no paresthesia on injection, incremental injection and local visualized surrounding nerve on ultrasound  Evidence of intravascular injection: No

## 2022-06-20 NOTE — ANESTHESIA PREPROCEDURE EVALUATION
Case: [id number] Date/Time: 06/20/22 1038    Procedure: ORIF, ANKLE (Right )    [place] [address] [place] [facility]    Surgeons: Chidi Sterling M.D.          Relevant Problems   No relevant active problems       Physical Exam    Airway   Mallampati: I  TM distance: >3 FB       Cardiovascular - normal exam     Dental - normal exam           Pulmonary   Breath sounds clear to auscultation     Abdominal    Neurological - normal exam                 Anesthesia Plan    ASA 1       Plan - general and peripheral nerve block     Peripheral nerve block will be post-op pain control  Airway plan will be LMA          Induction: intravenous      Pertinent diagnostic labs and testing reviewed    Informed Consent:    Anesthetic plan and risks discussed with patient.
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
## 2022-06-20 NOTE — ASSESSMENT & PLAN NOTE
Prophylactic anticoagulation for thrombotic prevention initially contraindicated secondary to elevated bleeding risk.  6/21 Trauma surveillance venous duplex scanning ordered.

## 2022-06-20 NOTE — PROGRESS NOTES
Pt arrived on the floor from PACU with transporter. Pt's AxOx4, VSS stable.      Assessment complete.

## 2022-06-20 NOTE — ANESTHESIA TIME REPORT
Anesthesia Start and Stop Event Times     Date Time Event    6/20/2022 1000 Ready for Procedure     1014 Anesthesia Start     1124 Anesthesia Stop        Responsible Staff  06/20/22    Name Role Begin End    Ling Christopher M.D. Anesth 1014 1124        Overtime Reason:  no overtime (within assigned shift)    Comments:

## 2022-06-20 NOTE — CARE PLAN
Problem: Pain - Standard  Goal: Alleviation of pain or a reduction in pain to the patient’s comfort goal  6/20/2022 0030 by Melissa Boateng R.N.  Outcome: Progressing  6/20/2022 0030 by Melissa Boateng R.N.  Outcome: Progressing     Problem: Knowledge Deficit - Standard  Goal: Patient and family/care givers will demonstrate understanding of plan of care, disease process/condition, diagnostic tests and medications  Outcome: Progressing     Problem: Fall Risk  Goal: Patient will remain free from falls  Outcome: Progressing     The patient is Stable - Low risk of patient condition declining or worsening    Shift Goals  Clinical Goals: pain management  Patient Goals: Pain management, rest    Progress made toward(s) clinical / shift goals:  Reinforce fall/ safety precautions. Bed on low and locked. Call light within reach. Pt's pain is controlled with scheduled and Prn medications.     Patient is not progressing towards the following goals:
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
## 2022-06-20 NOTE — ANESTHESIA TIME REPORT
Anesthesia Start and Stop Event Times     Date Time Event    6/19/2022 1743 Ready for Procedure     1759 Anesthesia Start     1848 Anesthesia Stop        Responsible Staff  06/19/22    Name Role Begin End    Ayaka Dahl M.D. Anesth 1759 1848        Overtime Reason:  no overtime (within assigned shift)    Comments:

## 2022-06-21 LAB
ABO + RH BLD: NORMAL
ANION GAP SERPL CALC-SCNC: 10 MMOL/L (ref 7–16)
BASOPHILS # BLD AUTO: 0.3 % (ref 0–1.8)
BASOPHILS # BLD: 0.04 K/UL (ref 0–0.12)
BUN SERPL-MCNC: 9 MG/DL (ref 8–22)
CALCIUM SERPL-MCNC: 8.4 MG/DL (ref 8.5–10.5)
CHLORIDE SERPL-SCNC: 108 MMOL/L (ref 96–112)
CO2 SERPL-SCNC: 23 MMOL/L (ref 20–33)
CREAT SERPL-MCNC: 0.57 MG/DL (ref 0.5–1.4)
EOSINOPHIL # BLD AUTO: 0 K/UL (ref 0–0.51)
EOSINOPHIL NFR BLD: 0 % (ref 0–6.9)
ERYTHROCYTE [DISTWIDTH] IN BLOOD BY AUTOMATED COUNT: 42.8 FL (ref 35.9–50)
GFR SERPLBLD CREATININE-BSD FMLA CKD-EPI: 121 ML/MIN/1.73 M 2
GLUCOSE SERPL-MCNC: 102 MG/DL (ref 65–99)
HCT VFR BLD AUTO: 31.5 % (ref 37–47)
HGB BLD-MCNC: 10.7 G/DL (ref 12–16)
IMM GRANULOCYTES # BLD AUTO: 0.05 K/UL (ref 0–0.11)
IMM GRANULOCYTES NFR BLD AUTO: 0.3 % (ref 0–0.9)
LYMPHOCYTES # BLD AUTO: 2.91 K/UL (ref 1–4.8)
LYMPHOCYTES NFR BLD: 19.2 % (ref 22–41)
MCH RBC QN AUTO: 30.8 PG (ref 27–33)
MCHC RBC AUTO-ENTMCNC: 34 G/DL (ref 33.6–35)
MCV RBC AUTO: 90.8 FL (ref 81.4–97.8)
MONOCYTES # BLD AUTO: 1.4 K/UL (ref 0–0.85)
MONOCYTES NFR BLD AUTO: 9.3 % (ref 0–13.4)
NEUTROPHILS # BLD AUTO: 10.72 K/UL (ref 2–7.15)
NEUTROPHILS NFR BLD: 70.9 % (ref 44–72)
NRBC # BLD AUTO: 0 K/UL
NRBC BLD-RTO: 0 /100 WBC
PLATELET # BLD AUTO: 255 K/UL (ref 164–446)
PMV BLD AUTO: 10.9 FL (ref 9–12.9)
POTASSIUM SERPL-SCNC: 3.7 MMOL/L (ref 3.6–5.5)
RBC # BLD AUTO: 3.47 M/UL (ref 4.2–5.4)
SODIUM SERPL-SCNC: 141 MMOL/L (ref 135–145)
WBC # BLD AUTO: 15.1 K/UL (ref 4.8–10.8)

## 2022-06-21 PROCEDURE — 97116 GAIT TRAINING THERAPY: CPT

## 2022-06-21 PROCEDURE — 80048 BASIC METABOLIC PNL TOTAL CA: CPT

## 2022-06-21 PROCEDURE — 97165 OT EVAL LOW COMPLEX 30 MIN: CPT

## 2022-06-21 PROCEDURE — A9270 NON-COVERED ITEM OR SERVICE: HCPCS | Performed by: SURGERY

## 2022-06-21 PROCEDURE — 97161 PT EVAL LOW COMPLEX 20 MIN: CPT

## 2022-06-21 PROCEDURE — 770001 HCHG ROOM/CARE - MED/SURG/GYN PRIV*

## 2022-06-21 PROCEDURE — RXMED WILLOW AMBULATORY MEDICATION CHARGE: Performed by: PHYSICIAN ASSISTANT

## 2022-06-21 PROCEDURE — 85025 COMPLETE CBC W/AUTO DIFF WBC: CPT

## 2022-06-21 PROCEDURE — 700102 HCHG RX REV CODE 250 W/ 637 OVERRIDE(OP): Performed by: SURGERY

## 2022-06-21 RX ORDER — ASPIRIN 81 MG/1
81 TABLET ORAL DAILY
Qty: 30 TABLET | Refills: 0 | Status: SHIPPED | OUTPATIENT
Start: 2022-06-21 | End: 2023-07-17

## 2022-06-21 RX ORDER — OXYCODONE HYDROCHLORIDE AND ACETAMINOPHEN 5; 325 MG/1; MG/1
1 TABLET ORAL EVERY 4 HOURS PRN
Qty: 65 TABLET | Refills: 0 | Status: SHIPPED | OUTPATIENT
Start: 2022-06-21 | End: 2022-07-06

## 2022-06-21 RX ORDER — DOCUSATE SODIUM 100 MG/1
100 CAPSULE, LIQUID FILLED ORAL 2 TIMES DAILY
Qty: 30 CAPSULE | Refills: 0 | Status: SHIPPED | OUTPATIENT
Start: 2022-06-21 | End: 2023-07-17

## 2022-06-21 RX ADMIN — ACETAMINOPHEN 650 MG: 325 TABLET ORAL at 17:21

## 2022-06-21 RX ADMIN — CELECOXIB 200 MG: 200 CAPSULE ORAL at 04:42

## 2022-06-21 RX ADMIN — OXYCODONE HYDROCHLORIDE 10 MG: 10 TABLET ORAL at 08:06

## 2022-06-21 RX ADMIN — OXYCODONE HYDROCHLORIDE 10 MG: 10 TABLET ORAL at 17:21

## 2022-06-21 RX ADMIN — OXYCODONE HYDROCHLORIDE 10 MG: 10 TABLET ORAL at 12:17

## 2022-06-21 RX ADMIN — OXYCODONE HYDROCHLORIDE 10 MG: 10 TABLET ORAL at 14:19

## 2022-06-21 RX ADMIN — CELECOXIB 200 MG: 200 CAPSULE ORAL at 17:21

## 2022-06-21 RX ADMIN — OXYCODONE HYDROCHLORIDE 10 MG: 10 TABLET ORAL at 04:41

## 2022-06-21 RX ADMIN — DOCUSATE SODIUM 100 MG: 100 CAPSULE, LIQUID FILLED ORAL at 04:42

## 2022-06-21 RX ADMIN — OXYCODONE HYDROCHLORIDE 10 MG: 10 TABLET ORAL at 20:23

## 2022-06-21 RX ADMIN — SENNOSIDES AND DOCUSATE SODIUM 1 TABLET: 50; 8.6 TABLET ORAL at 20:24

## 2022-06-21 RX ADMIN — ACETAMINOPHEN 650 MG: 325 TABLET ORAL at 04:42

## 2022-06-21 RX ADMIN — ACETAMINOPHEN 650 MG: 325 TABLET ORAL at 11:06

## 2022-06-21 ASSESSMENT — PAIN DESCRIPTION - PAIN TYPE
TYPE: SURGICAL PAIN

## 2022-06-21 ASSESSMENT — COGNITIVE AND FUNCTIONAL STATUS - GENERAL
DRESSING REGULAR LOWER BODY CLOTHING: A LITTLE
SUGGESTED CMS G CODE MODIFIER DAILY ACTIVITY: CJ
HELP NEEDED FOR BATHING: A LITTLE
STANDING UP FROM CHAIR USING ARMS: A LITTLE
MOBILITY SCORE: 21
DAILY ACTIVITIY SCORE: 21
WALKING IN HOSPITAL ROOM: A LITTLE
MOBILITY SCORE: 18
SUGGESTED CMS G CODE MODIFIER MOBILITY: CK
STANDING UP FROM CHAIR USING ARMS: A LITTLE
TOILETING: A LITTLE
MOVING TO AND FROM BED TO CHAIR: A LITTLE
TURNING FROM BACK TO SIDE WHILE IN FLAT BAD: A LITTLE
SUGGESTED CMS G CODE MODIFIER MOBILITY: CJ
MOVING FROM LYING ON BACK TO SITTING ON SIDE OF FLAT BED: A LITTLE
SUGGESTED CMS G CODE MODIFIER DAILY ACTIVITY: CH
CLIMB 3 TO 5 STEPS WITH RAILING: A LITTLE
CLIMB 3 TO 5 STEPS WITH RAILING: A LITTLE
WALKING IN HOSPITAL ROOM: A LITTLE
DAILY ACTIVITIY SCORE: 24

## 2022-06-21 ASSESSMENT — GAIT ASSESSMENTS
GAIT LEVEL OF ASSIST: STANDBY ASSIST
DEVIATION: OTHER (COMMENT)
DISTANCE (FEET): 20
ASSISTIVE DEVICE: FRONT WHEEL WALKER

## 2022-06-21 ASSESSMENT — ACTIVITIES OF DAILY LIVING (ADL): TOILETING: INDEPENDENT

## 2022-06-21 NOTE — CARE PLAN
Problem: Pain - Standard  Goal: Alleviation of pain or a reduction in pain to the patient’s comfort goal  Outcome: Progressing     Problem: Knowledge Deficit - Standard  Goal: Patient and family/care givers will demonstrate understanding of plan of care, disease process/condition, diagnostic tests and medications  Outcome: Progressing     Problem: Fall Risk  Goal: Patient will remain free from falls  Outcome: Progressing     The patient is Stable - Low risk of patient condition declining or worsening    Shift Goals  Clinical Goals: Pain management  Patient Goals: Pain management    Progress made toward(s) clinical / shift goals:  Pain medication administered per MAR. Pt educated about non-pharmacological pain control interventions.    Patient is not progressing towards the following goals:

## 2022-06-21 NOTE — DISCHARGE INSTRUCTIONS
Discharge Instructions    Discharged to home by car with relative. Discharged via wheelchair, hospital escort: Yes.  Special equipment needed: Not Applicable    Be sure to schedule a follow-up appointment with your primary care doctor or any specialists as instructed.   Follow up with Dr. Sterling within two weeks  Take medications as prescribed  For any questions or concerns please contact surgeon or PCP    Discharge Plan:        I understand that a diet low in cholesterol, fat, and sodium is recommended for good health. Unless I have been given specific instructions below for another diet, I accept this instruction as my diet prescription.   Other diet: Regular diet    Special Instructions: Discharge instructions for the Orthopedic Patient    Follow up with Primary Care Physician within 2 weeks of discharge to home, regarding:  Review of medications and diagnostic testing.  Surveillance for medical complications.  Workup and treatment of osteoporosis, if appropriate.     -Is this a Hip/Knee/Shoulder Joint Replacement patient? No    -Is this patient being discharged with medication to prevent blood clots?  Yes, Aspirin   Aspirin, ASA oral tablets  What is this medicine?  ASPIRIN (AS pir in) is a pain reliever. It is used to treat mild pain and fever. This medicine is also used as directed by a doctor to prevent and to treat heart attacks, to prevent strokes and blood clots, and to treat arthritis or inflammation.  This medicine may be used for other purposes; ask your health care provider or pharmacist if you have questions.  COMMON BRAND NAME(S): Aspir-Low, Aspir-Mehnaz, Aspirtab, Juju Advanced Aspirin, Juju Aspirin, Juju Aspirin Extra Strength, Juju Aspirin Plus, Juju Extra Strength, Juju Extra Strength Plus, Juju Genuine Aspirin, Juju Womens Aspirin, Bufferin, Bufferin Extra Strength, Bufferin Low Dose  What should I tell my health care provider before I take this medicine?  They need to know if you have  any of these conditions:  anemia  asthma  bleeding problems  child with chickenpox, the flu, or other viral infection  diabetes  gout  if you frequently drink alcohol containing drinks  kidney disease  liver disease  low level of vitamin K  lupus  smoke tobacco  stomach ulcers or other problems  an unusual or allergic reaction to aspirin, tartrazine dye, other medicines, dyes, or preservatives  pregnant or trying to get pregnant  breast-feeding  How should I use this medicine?  Take this medicine by mouth with a glass of water. Follow the directions on the package or prescription label. You can take this medicine with or without food. If it upsets your stomach, take it with food. Do not take your medicine more often than directed.  Talk to your pediatrician regarding the use of this medicine in children. While this drug may be prescribed for children as young as 12 years of age for selected conditions, precautions do apply. Children and teenagers should not use this medicine to treat chicken pox or flu symptoms unless directed by a doctor.  Patients over 65 years old may have a stronger reaction and need a smaller dose.  Overdosage: If you think you have taken too much of this medicine contact a poison control center or emergency room at once.  NOTE: This medicine is only for you. Do not share this medicine with others.  What if I miss a dose?  If you are taking this medicine on a regular schedule and miss a dose, take it as soon as you can. If it is almost time for your next dose, take only that dose. Do not take double or extra doses.  What may interact with this medicine?  Do not take this medicine with any of the following medications:  cidofovir  ketorolac  probenecid  This medicine may also interact with the following medications:  alcohol  alendronate  bismuth subsalicylate  flavocoxid  herbal supplements like feverfew, garlic, gayatri, ginkgo biloba, horse chestnut  medicines for diabetes or glaucoma like  acetazolamide, methazolamide  medicines for gout  medicines that treat or prevent blood clots like enoxaparin, heparin, ticlopidine, warfarin  other aspirin and aspirin-like medicines  NSAIDs, medicines for pain and inflammation, like ibuprofen or naproxen  pemetrexed  sulfinpyrazone  varicella live vaccine  This list may not describe all possible interactions. Give your health care provider a list of all the medicines, herbs, non-prescription drugs, or dietary supplements you use. Also tell them if you smoke, drink alcohol, or use illegal drugs. Some items may interact with your medicine.  What should I watch for while using this medicine?  If you are treating yourself for pain, tell your doctor or health care professional if the pain lasts more than 10 days, if it gets worse, or if there is a new or different kind of pain. Tell your doctor if you see redness or swelling. Also, check with your doctor if you have a fever that lasts for more than 3 days. Only take this medicine to prevent heart attacks or blood clotting if prescribed by your doctor or health care professional.  Do not take aspirin or aspirin-like medicines with this medicine. Too much aspirin can be dangerous. Always read the labels carefully.  This medicine can irritate your stomach or cause bleeding problems. Do not smoke cigarettes or drink alcohol while taking this medicine. Do not lie down for 30 minutes after taking this medicine to prevent irritation to your throat.  If you are scheduled for any medical or dental procedure, tell your healthcare provider that you are taking this medicine. You may need to stop taking this medicine before the procedure.  This medicine may be used to treat migraines. If you take migraine medicines for 10 or more days a month, your migraines may get worse. Keep a diary of headache days and medicine use. Contact your healthcare professional if your migraine attacks occur more frequently.  What side effects may I  notice from receiving this medicine?  Side effects that you should report to your doctor or health care professional as soon as possible:  allergic reactions like skin rash, itching or hives, swelling of the face, lips, or tongue  breathing problems  changes in hearing, ringing in the ears  confusion  general ill feeling or flu-like symptoms  pain on swallowing  redness, blistering, peeling or loosening of the skin, including inside the mouth or nose  signs and symptoms of bleeding such as bloody or black, tarry stools; red or dark-brown urine; spitting up blood or brown material that looks like coffee grounds; red spots on the skin; unusual bruising or bleeding from the eye, gums, or nose  trouble passing urine or change in the amount of urine  unusually weak or tired  yellowing of the eyes or skin  Side effects that usually do not require medical attention (report to your doctor or health care professional if they continue or are bothersome):  diarrhea or constipation  headache  nausea, vomiting  stomach gas, heartburn  This list may not describe all possible side effects. Call your doctor for medical advice about side effects. You may report side effects to FDA at 2-979-FDA-8888.  Where should I keep my medicine?  Keep out of the reach of children.  Store at room temperature between 15 and 30 degrees C (59 and 86 degrees F). Protect from heat and moisture. Do not use this medicine if it has a strong vinegar smell. Throw away any unused medicine after the expiration date.  NOTE: This sheet is a summary. It may not cover all possible information. If you have questions about this medicine, talk to your doctor, pharmacist, or health care provider.  © 2020 [employer]/Gold Standard (2018-01-30 10:42:13)    Is patient discharged on Warfarin / Coumadin?   No   Oxycodone tablets or capsules  What is this medicine?  OXYCODONE (ox i KOE done) is a pain reliever. It is used to treat moderate to severe pain.  This medicine may  be used for other purposes; ask your health care provider or pharmacist if you have questions.  COMMON BRAND NAME(S): Dazidox, Endocodone, Oxaydo, OXECTA, OxyIR, Percolone, Roxicodone, Roxybond  What should I tell my health care provider before I take this medicine?  They need to know if you have any of these conditions:  Harpal's disease  brain tumor  head injury  heart disease  history of drug or alcohol abuse problem  if you often drink alcohol  kidney disease  liver disease  lung or breathing disease, like asthma  mental illness  pancreatic disease  seizures  thyroid disease  an unusual or allergic reaction to oxycodone, codeine, hydrocodone, morphine, other medicines, foods, dyes, or preservatives  pregnant or trying to get pregnant  breast-feeding  How should I use this medicine?  Take this medicine by mouth with a glass of water. Follow the directions on the prescription label. You can take it with or without food. If it upsets your stomach, take it with food. Take your medicine at regular intervals. Do not take it more often than directed. Do not stop taking except on your doctor's advice.  Some brands of this medicine, like Oxecta, have special instructions. Ask your doctor or pharmacist if these directions are for you: Do not cut, crush or chew this medicine. Swallow only one tablet at a time. Do not wet, soak, or lick the tablet before you take it.  A special MedGuide will be given to you by the pharmacist with each prescription and refill. Be sure to read this information carefully each time.  Talk to your pediatrician regarding the use of this medicine in children. Special care may be needed.  Overdosage: If you think you have taken too much of this medicine contact a poison control center or emergency room at once.  NOTE: This medicine is only for you. Do not share this medicine with others.  What if I miss a dose?  If you miss a dose, take it as soon as you can. If it is almost time for your next  dose, take only that dose. Do not take double or extra doses.  What may interact with this medicine?  This medicine may interact with the following medications:  alcohol  antihistamines for allergy, cough and cold  antiviral medicines for HIV or AIDS  atropine  certain antibiotics like clarithromycin, erythromycin, linezolid, rifampin  certain medicines for anxiety or sleep  certain medicines for bladder problems like oxybutynin, tolterodine  certain medicines for depression like amitriptyline, fluoxetine, sertraline  certain medicines for fungal infections like ketoconazole, itraconazole, voriconazole  certain medicines for migraine headache like almotriptan, eletriptan, frovatriptan, naratriptan, rizatriptan, sumatriptan, zolmitriptan  certain medicines for nausea or vomiting like dolasetron, ondansetron, palonosetron  certain medicines for Parkinson's disease like benztropine, trihexyphenidyl  certain medicines for seizures like phenobarbital, phenytoin, primidone  certain medicines for stomach problems like dicyclomine, hyoscyamine  certain medicines for travel sickness like scopolamine  diuretics  general anesthetics like halothane, isoflurane, methoxyflurane, propofol  ipratropium  local anesthetics like lidocaine, pramoxine, tetracaine  MAOIs like Carbex, Eldepryl, Marplan, Nardil, and Parnate  medicines that relax muscles for surgery  methylene blue  nilotinib  other narcotic medicines for pain or cough  phenothiazines like chlorpromazine, mesoridazine, prochlorperazine, thioridazine  This list may not describe all possible interactions. Give your health care provider a list of all the medicines, herbs, non-prescription drugs, or dietary supplements you use. Also tell them if you smoke, drink alcohol, or use illegal drugs. Some items may interact with your medicine.  What should I watch for while using this medicine?  Tell your doctor or health care professional if your pain does not go away, if it gets  worse, or if you have new or a different type of pain. You may develop tolerance to the medicine. Tolerance means that you will need a higher dose of the medicine for pain relief. Tolerance is normal and is expected if you take this medicine for a long time.  Do not suddenly stop taking your medicine because you may develop a severe reaction. Your body becomes used to the medicine. This does NOT mean you are addicted. Addiction is a behavior related to getting and using a drug for a non-medical reason. If you have pain, you have a medical reason to take pain medicine. Your doctor will tell you how much medicine to take. If your doctor wants you to stop the medicine, the dose will be slowly lowered over time to avoid any side effects.  There are different types of narcotic medicines (opiates). If you take more than one type at the same time or if you are taking another medicine that also causes drowsiness, you may have more side effects. Give your health care provider a list of all medicines you use. Your doctor will tell you how much medicine to take. Do not take more medicine than directed. Call emergency for help if you have problems breathing or unusual sleepiness.  You may get drowsy or dizzy. Do not drive, use machinery, or do anything that needs mental alertness until you know how the medicine affects you. Do not stand or sit up quickly, especially if you are an older patient. This reduces the risk of dizzy or fainting spells. Alcohol may interfere with the effect of this medicine. Avoid alcoholic drinks.  This medicine will cause constipation. Try to have a bowel movement at least every 2 to 3 days. If you do not have a bowel movement for 3 days, call your doctor or health care professional.  Your mouth may get dry. Chewing sugarless gum or sucking hard candy, and drinking plenty of water may help. Contact your doctor if the problem does not go away or is severe.  What side effects may I notice from receiving  this medicine?  Side effects that you should report to your doctor or health care professional as soon as possible:  allergic reactions like skin rash, itching or hives, swelling of the face, lips, or tongue  breathing problems  confusion  signs and symptoms of low blood pressure like dizziness; feeling faint or lightheaded, falls; unusually weak or tired  trouble passing urine or change in the amount of urine  trouble swallowing  Side effects that usually do not require medical attention (report to your doctor or health care professional if they continue or are bothersome):  constipation  dry mouth  nausea, vomiting  tiredness  This list may not describe all possible side effects. Call your doctor for medical advice about side effects. You may report side effects to FDA at 2-995-FDA-4480.  Where should I keep my medicine?  Keep out of the reach of children. This medicine can be abused. Keep your medicine in a safe place to protect it from theft. Do not share this medicine with anyone. Selling or giving away this medicine is dangerous and against the law.  Store at room temperature between 15 and 30 degrees C (59 and 86 degrees F). Protect from light. Keep container tightly closed.  This medicine may cause harm and death if it is taken by other adults, children, or pets. Return medicine that has not been used to an official disposal site. Contact the [facility] at 1-617.933.6722 or your [facility] government to find a site. If you cannot return the medicine, flush it down the toilet. Do not use the medicine after the expiration date.  NOTE: This sheet is a summary. It may not cover all possible information. If you have questions about this medicine, talk to your doctor, pharmacist, or health care provider.  © 2020 [employer]/Gold Standard (2018-04-24 16:13:10)  Open Reduction, Internal Fixation (ORIF), Generic  Usually, if bones are broken (fractured) and are out of place, unstable, or may become out of place, surgery is  needed. This surgery is called an open reduction and internal fixation (ORIF). Open reduction means that the area of the fracture is opened up so the surgeon can see it. Internal fixation means that screws, pins, or fixation devices are used to hold the bone pieces in place.  LET YOUR CAREGIVER KNOW ABOUT:   Allergies.  Medicines taken, including herbs, eyedrops, over-the-counter medicines, and creams.  Use of steroids (by mouth or creams).  Previous problems with anesthetics or numbing medicines.  History of bleeding or blood problems.  History of blood clots.  Possibility of pregnancy, if this applies.  Previous surgery.  Other health problems.  RISKS AND COMPLICATIONS   All surgery is associated with risks. Some of these risks are:  Excessive bleeding.  Infection.  Imperfect results with loss of joint function.  BEFORE THE PROCEDURE   Usually, surgery is performed shortly after the injury. It is important to provide information to your caregiver after your injury.  AFTER THE PROCEDURE   After surgery, you will be taken to a recovery area where a nurse will monitor your progress. You may have a long, narrow tube(catheter) in the bladder following surgery that helps you pass your water. When awake, stable, taking fluids well, and without complications, you will be returned to your room. You will receive physical therapy and other care. Physical therapy is done until you are doing well and your caregiver feels it is safe for you to go home or to an extended care facility.  Following surgery, the bones may be protected with a cast. The type of casting depends on where the fracture was. Casts are generally left in place for about 5 to 6 weeks. During this time, your caregiver will follow your progress. X-rays may be taken during healing to make sure the bones stay in place.  HOME CARE INSTRUCTIONS   You or your child may resume normal diet and activities as directed or allowed.  Put ice on the injured area.  Put ice  in a plastic bag.  Place a towel between the skin and the bag.  Leave the ice on for 15-20 minutes at a time, 3-4 times a day, for the first 2 days following surgery.  Change bandages (dressings) if necessary or as directed.  If given a plaster or fiberglass cast:  Do not try to scratch the skin under the cast using sharp or pointed objects.  Check the skin around the cast every day. You may put lotion on any red or sore areas.  Keep the cast dry and clean.  Do not put pressure on any part of the cast or splint until it is fully hardened.  The cast or splint can be protected during bathing with a plastic bag. Do not lower the cast or splint into water.  Only take over-the-counter or prescription medicines for pain, discomfort, or fever as directed by your caregiver.  Use crutches as directed and do not exercise the leg unless instructed.  If the bones get out of position (displaced), it may eventually lead to arthritis and lasting disability. Problems can follow even the best of care. Follow the directions of your caregiver.  Follow all instructions given by your caregiver, make and keep follow-up appointments, and use crutches as directed.  SEEK IMMEDIATE MEDICAL CARE IF:   There is redness, swelling, numbness, or increasing pain in the wound.  There is pus coming from the wound.  You or your child has an oral temperature above 102° F (38.9° C), not controlled by medicine.  A bad smell is coming from the wound or dressing.  The wound breaks open (edges not staying together) after stitches (sutures) or staples have been removed.  The skin or nails below the injury turn blue or gray, or feel cold or numb.  There is severe pain under the cast or in the foot.  If there is not a window in the cast for observing the wound, a discharge or minor bleeding may show up as a stain on the outside of the cast. Report these findings to your caregiver.  MAKE SURE YOU:   Understand these instructions.  Will watch your  condition.  Will get help right away if you are not doing well or gets worse.  Document Released: 12/29/2007 Document Revised: 03/11/2013 Document Reviewed: 12/05/2008  ExitCare® Patient Information ©2014 [employer].      Depression / Suicide Risk    As you are discharged from this [facility] facility, it is important to learn how to keep safe from harming yourself.    Recognize the warning signs:  Abrupt changes in personality, positive or negative- including increase in energy   Giving away possessions  Change in eating patterns- significant weight changes-  positive or negative  Change in sleeping patterns- unable to sleep or sleeping all the time   Unwillingness or inability to communicate  Depression  Unusual sadness, discouragement and loneliness  Talk of wanting to die  Neglect of personal appearance   Rebelliousness- reckless behavior  Withdrawal from people/activities they love  Confusion- inability to concentrate     If you or a loved one observes any of these behaviors or has concerns about self-harm, here's what you can do:  Talk about it- your feelings and reasons for harming yourself  Remove any means that you might use to hurt yourself (examples: pills, rope, extension cords, firearm)  Get professional help from the community (Mental Health, Substance Abuse, psychological counseling)  Do not be alone:Call your Safe Contact- someone whom you trust who will be there for you.  Call your local CRISIS HOTLINE 868-6794 or 135-904-9829  Call your local Children's Mobile Crisis Response Team [place] (120) 059-6287 or www.[facility]  Call the toll free National Suicide Prevention Hotlines   National Suicide Prevention Lifeline 452-451-VMGN (2969)  [place] Hope Line Network 800-SUICIDE (583-1670)    Discharge Instructions    Discharged to home by car with relative. Discharged via wheelchair, hospital escort: Yes.  Special equipment needed: Not Applicable    Be sure to schedule a follow-up  appointment with your primary care doctor or any specialists as instructed.     Discharge Plan:   Diet Plan: Discussed  Activity Level: Discussed  Confirmed Follow up Appointment: Patient to Call and Schedule Appointment  Confirmed Symptoms Management: Discussed  Medication Reconciliation Updated: Yes    I understand that a diet low in cholesterol, fat, and sodium is recommended for good health. Unless I have been given specific instructions below for another diet, I accept this instruction as my diet prescription.   Other diet: regular    Special Instructions: None    Is patient discharged on Warfarin / Coumadin?   No
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
## 2022-06-21 NOTE — CARE PLAN
Problem: Pain - Standard  Goal: Alleviation of pain or a reduction in pain to the patient’s comfort goal  Outcome: Progressing     Problem: Knowledge Deficit - Standard  Goal: Patient and family/care givers will demonstrate understanding of plan of care, disease process/condition, diagnostic tests and medications  Outcome: Progressing     Problem: Fall Risk  Goal: Patient will remain free from falls  Outcome: Progressing     The patient is Stable - Low risk of patient condition declining or worsening    Shift Goals  Clinical Goals: Pain management, mobility  Patient Goals: Pain management    Progress made toward(s) clinical / shift goals:  Pain medication administered per MAR. Pt educated about non-pharmacological pain control interventions including ice packs, distraction, elevation, and rest. Pt ambulated up to the chair and up to the bathroom multiple times today with FWW SBA.    Patient is not progressing towards the following goals:

## 2022-06-21 NOTE — CARE PLAN
Problem: Pain - Standard  Goal: Alleviation of pain or a reduction in pain to the patient’s comfort goal  Outcome: Progressing     Problem: Fall Risk  Goal: Patient will remain free from falls  Outcome: Progressing     The patient is Stable - Low risk of patient condition declining or worsening    Shift Goals  Clinical Goals: pain management  Patient Goals: Pain management    Progress made toward(s) clinical / shift goals:  Reinforce fall/safety precautions. Bed on lowest position and locked. Call light within reach. Pt's pain is controlled with PRN and scheduled medications.     Patient is not progressing towards the following goals:

## 2022-06-21 NOTE — DISCHARGE PLANNING
Received Choice form at 2327  Agency/Facility Name: [facility]  Referral sent per Choice form @ [phone]
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
## 2022-06-21 NOTE — PROGRESS NOTES
4 Eyes Skin Assessment Completed by PAUL Davis and PAUL Bruner.    Head WDL  Ears WDL  Nose WDL  Mouth WDL  Neck WDL  Breast/Chest WDL  Shoulder Blades WDL  Spine WDL  (R) Arm/Elbow/Hand Abrasions  (L) Arm/Elbow/Hand Abrasions  Abdomen WDL  Groin WDL  Scrotum/Coccyx/Buttocks WDL  (R) Leg Incision with dressing in place  (L) Leg WDL  (R) Heel/Foot/Toe incision with dressing in place  (L) Heel/Foot/Toe WDL          Devices In Places Pulse Ox      Interventions In Place Pillows and Pressure Redistribution Mattress    Possible Skin Injury No    Pictures Uploaded Into Epic N/A  Wound Consult Placed N/A  RN Wound Prevention Protocol Ordered No

## 2022-06-22 ENCOUNTER — PHARMACY VISIT (OUTPATIENT)
Dept: PHARMACY | Facility: MEDICAL CENTER | Age: 37
End: 2022-06-22
Payer: COMMERCIAL

## 2022-06-22 ENCOUNTER — APPOINTMENT (OUTPATIENT)
Dept: RADIOLOGY | Facility: MEDICAL CENTER | Age: 37
DRG: 492 | End: 2022-06-22
Attending: NURSE PRACTITIONER
Payer: COMMERCIAL

## 2022-06-22 VITALS
HEART RATE: 70 BPM | RESPIRATION RATE: 16 BRPM | OXYGEN SATURATION: 99 % | BODY MASS INDEX: 27.1 KG/M2 | WEIGHT: 125.6 LBS | SYSTOLIC BLOOD PRESSURE: 128 MMHG | DIASTOLIC BLOOD PRESSURE: 84 MMHG | TEMPERATURE: 98 F | HEIGHT: 57 IN

## 2022-06-22 LAB
ANION GAP SERPL CALC-SCNC: 10 MMOL/L (ref 7–16)
ANISOCYTOSIS BLD QL SMEAR: ABNORMAL
BASOPHILS # BLD AUTO: 1.7 % (ref 0–1.8)
BASOPHILS # BLD: 0.18 K/UL (ref 0–0.12)
BUN SERPL-MCNC: 12 MG/DL (ref 8–22)
CALCIUM SERPL-MCNC: 8.2 MG/DL (ref 8.5–10.5)
CHLORIDE SERPL-SCNC: 105 MMOL/L (ref 96–112)
CO2 SERPL-SCNC: 26 MMOL/L (ref 20–33)
CREAT SERPL-MCNC: 0.78 MG/DL (ref 0.5–1.4)
EOSINOPHIL # BLD AUTO: 0.1 K/UL (ref 0–0.51)
EOSINOPHIL NFR BLD: 0.9 % (ref 0–6.9)
ERYTHROCYTE [DISTWIDTH] IN BLOOD BY AUTOMATED COUNT: 42.6 FL (ref 35.9–50)
GFR SERPLBLD CREATININE-BSD FMLA CKD-EPI: 101 ML/MIN/1.73 M 2
GLUCOSE SERPL-MCNC: 89 MG/DL (ref 65–99)
HCT VFR BLD AUTO: 29.5 % (ref 37–47)
HGB BLD-MCNC: 9.8 G/DL (ref 12–16)
LYMPHOCYTES # BLD AUTO: 4.27 K/UL (ref 1–4.8)
LYMPHOCYTES NFR BLD: 39.5 % (ref 22–41)
MANUAL DIFF BLD: NORMAL
MCH RBC QN AUTO: 30.4 PG (ref 27–33)
MCHC RBC AUTO-ENTMCNC: 33.2 G/DL (ref 33.6–35)
MCV RBC AUTO: 91.6 FL (ref 81.4–97.8)
MICROCYTES BLD QL SMEAR: ABNORMAL
MONOCYTES # BLD AUTO: 0.48 K/UL (ref 0–0.85)
MONOCYTES NFR BLD AUTO: 4.4 % (ref 0–13.4)
MORPHOLOGY BLD-IMP: NORMAL
NEUTROPHILS # BLD AUTO: 5.78 K/UL (ref 2–7.15)
NEUTROPHILS NFR BLD: 53.5 % (ref 44–72)
NRBC # BLD AUTO: 0 K/UL
NRBC BLD-RTO: 0 /100 WBC
OVALOCYTES BLD QL SMEAR: NORMAL
PLATELET # BLD AUTO: 239 K/UL (ref 164–446)
PLATELET BLD QL SMEAR: NORMAL
PMV BLD AUTO: 10.4 FL (ref 9–12.9)
POIKILOCYTOSIS BLD QL SMEAR: NORMAL
POTASSIUM SERPL-SCNC: 3.5 MMOL/L (ref 3.6–5.5)
RBC # BLD AUTO: 3.22 M/UL (ref 4.2–5.4)
RBC BLD AUTO: PRESENT
SODIUM SERPL-SCNC: 141 MMOL/L (ref 135–145)
WBC # BLD AUTO: 10.8 K/UL (ref 4.8–10.8)

## 2022-06-22 PROCEDURE — 700102 HCHG RX REV CODE 250 W/ 637 OVERRIDE(OP): Performed by: SURGERY

## 2022-06-22 PROCEDURE — 85025 COMPLETE CBC W/AUTO DIFF WBC: CPT

## 2022-06-22 PROCEDURE — 93970 EXTREMITY STUDY: CPT

## 2022-06-22 PROCEDURE — 85007 BL SMEAR W/DIFF WBC COUNT: CPT

## 2022-06-22 PROCEDURE — A9270 NON-COVERED ITEM OR SERVICE: HCPCS | Performed by: SURGERY

## 2022-06-22 PROCEDURE — 80048 BASIC METABOLIC PNL TOTAL CA: CPT

## 2022-06-22 PROCEDURE — 93970 EXTREMITY STUDY: CPT | Mod: 26,GZ | Performed by: INTERNAL MEDICINE

## 2022-06-22 RX ADMIN — CELECOXIB 200 MG: 200 CAPSULE ORAL at 04:53

## 2022-06-22 RX ADMIN — DOCUSATE SODIUM 100 MG: 100 CAPSULE, LIQUID FILLED ORAL at 04:53

## 2022-06-22 RX ADMIN — ACETAMINOPHEN 650 MG: 325 TABLET ORAL at 13:25

## 2022-06-22 RX ADMIN — ACETAMINOPHEN 650 MG: 325 TABLET ORAL at 04:53

## 2022-06-22 RX ADMIN — OXYCODONE HYDROCHLORIDE 10 MG: 10 TABLET ORAL at 09:45

## 2022-06-22 RX ADMIN — OXYCODONE HYDROCHLORIDE 10 MG: 10 TABLET ORAL at 04:54

## 2022-06-22 ASSESSMENT — PAIN DESCRIPTION - PAIN TYPE
TYPE: SURGICAL PAIN

## 2022-06-22 NOTE — PROGRESS NOTES
Pt DC'd. IV removed, discharge instructions provided to patient, pt verbalizes understanding. Pt states all questions have been answered. Copy of discharge paperwork provided to pt, signed copy in chart. Pt states all belongings in possession. Pt asking to visit fiance in ICU, pt and family member shown to ICU per request.

## 2022-06-22 NOTE — CARE PLAN
Problem: Pain - Standard  Goal: Alleviation of pain or a reduction in pain to the patient’s comfort goal  Outcome: Progressing     Problem: Knowledge Deficit - Standard  Goal: Patient and family/care givers will demonstrate understanding of plan of care, disease process/condition, diagnostic tests and medications  Outcome: Progressing     Problem: Fall Risk  Goal: Patient will remain free from falls  Outcome: Progressing       The patient is Stable - Low risk of patient condition declining or worsening    Shift Goals  Clinical Goals: Pain management, mobility  Patient Goals: Pain management    Progress made toward(s) clinical / shift goals:  Pain is controlled with PRN and scheduled medications. Reinforce fall and safety precautions. Call light within reach. Bed on lowest position and locked.    Patient is not progressing towards the following goals:
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
## 2022-06-22 NOTE — CARE PLAN
The patient is Stable - Low risk of patient condition declining or worsening    Shift Goals  Clinical Goals: pain management  Patient Goals: Pain management, rest    Progress made toward(s) clinical / shift goals:      Patient is not progressing towards the following goals:    Problem: Pain - Standard  Goal: Alleviation of pain or a reduction in pain to the patient’s comfort goal  Outcome: Progressing    Educated on pain scale. Encouraged to verbalize pain. Will medicate as per MAR.     Problem: Knowledge Deficit - Standard  Goal: Patient and family/care givers will demonstrate understanding of plan of care, disease process/condition, diagnostic tests and medications  Outcome: Progressing     POC discussed with the patient. Discharge instructions given by Discharge RN. Questions answered. Verbalize understanding.    Problem: Fall Risk  Goal: Patient will remain free from falls  Outcome: Progressing    Fall protocol in effect. Non skid socks in use. Call light within reach. Reminded patient to call for assist. Kept room clutter free. Verbalized understanding.

## 2022-06-22 NOTE — PROGRESS NOTES
0655 Patient's sitting up in bed. Bedside report received from   SAYRA Torres RN at the beginning of the shift.    0830 Patients sitting up in bed. Educated on the importance/use of IS at least 10x every hour while awake, able to reach 2000. Fall protocol in effect. Call light withi reach. Reminded patient to call for assist. Assessment completed. No distress noted. Plan of care reviewed with the patient. Verbalized understanding.         0945 Medicated with Oxycodone (see MAR) for c/o's right leg pain, rates pain 8/10. Ice pack given.     1015 SHANTANU Adhikari visited. POC discussed with the patient.    1119 New order received and acknowledged from Flores PA-C for the patient to be discharged.    1300 Patient's having US trauma Vein Screen lower bilat extremity as per order.           .         1355  Patient was transferred to Discharge Lounge with patient's belongings, FWW, meds to bed, disc from film room via w/c accompanied by one female Care Aid.           .

## 2023-07-17 ENCOUNTER — GYNECOLOGY VISIT (OUTPATIENT)
Dept: OBGYN | Facility: CLINIC | Age: 38
End: 2023-07-17
Payer: COMMERCIAL

## 2023-07-17 VITALS
HEIGHT: 59 IN | DIASTOLIC BLOOD PRESSURE: 58 MMHG | BODY MASS INDEX: 22.58 KG/M2 | WEIGHT: 112 LBS | SYSTOLIC BLOOD PRESSURE: 80 MMHG

## 2023-07-17 DIAGNOSIS — Z00.00 WELL WOMAN EXAM WITHOUT GYNECOLOGICAL EXAM: ICD-10-CM

## 2023-07-17 PROCEDURE — G0101 CA SCREEN;PELVIC/BREAST EXAM: HCPCS | Performed by: NURSE PRACTITIONER

## 2023-07-17 PROCEDURE — 3078F DIAST BP <80 MM HG: CPT | Performed by: NURSE PRACTITIONER

## 2023-07-17 PROCEDURE — 3074F SYST BP LT 130 MM HG: CPT | Performed by: NURSE PRACTITIONER

## 2023-07-17 ASSESSMENT — FIBROSIS 4 INDEX: FIB4 SCORE: 0.92
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
## 2023-07-17 NOTE — PROGRESS NOTES
Chelsea Ramos is a 37 y.o. y.o. female who presents for her annual gynecological exam.       HPI Comments: Pt reports no issues at this time. She is interested in getting her tubal reversed, it was done in . She reports she believes her tubes were cut and tied.     Review of Systems:  Cardio: Denies any issues.   Respiratory: Denies any issues.   Constitutional:  Denies any issues.   : Loi any issues.   Abdominal: Denies any issues.   Psychosocial: Denies any issues.   EENT: Denies any issues.   Metabolic: Denies any issues.   Pertinent positives documented in HPI and all other systems reviewed & are negative.     Gynecological hx:   Last pap was  and WNL. No hx of abnormal cervical cytology.     Denies any hx of STIs and was last tested for STIs [facility] last year.     Patient's last menstrual period was 2023 (exact date).. Reports has regular periods every 31 days lasting 3-5 days and started menstruating at age 8.     Currently sexually active with 1 sexual partner who identifies as a man. She has had a total of 10 sexual partners in lifetime. She is satisfied with her sexual experiences.     Denies any history of breast issues, surgeries, cancer.     OB Hx:  -  x 3   - SAB x 2    Reports diagnosis of PTSD and borderline and bipolar in , she has moved to [place] recently and wants to establish with a new mental health provider. Denies any other psychological hx including hospitalizations.   Denies any hx of or current issues with interpersonal violence.   Denies any use of alcohol, other drugs. Reports tobacco use and vaping THC, declines quitting.     All PMH, PSH, allergies, social history and FH reviewed and updated today:  History reviewed. No pertinent past medical history.  Past Surgical History:   Procedure Laterality Date    ORIF, ANKLE Right 2022    Procedure: ORIF, ANKLE- RIGHT PILION ORIF;  Surgeon: Chidi Sterling M.D.;  Location: SURGERY [facility];  Service:  "Orthopedics    EXTERNAL FIXATOR APPLICATION Right 6/19/2022    Procedure: APPLICATION, EXTERNAL FIXATION DEVICE;  Surgeon: Chidi Sterling M.D.;  Location: SURGERY [facility];  Service: Orthopedics    APPENDECTOMY      OTHER       Patient has no known allergies.  Social History     Socioeconomic History    Marital status: Single   Tobacco Use    Smoking status: Every Day     Packs/day: 0.50     Types: Cigarettes     Start date: 1996    Smokeless tobacco: Never   Vaping Use    Vaping Use: Every day    Substances: THC    Devices: Disposable   Substance and Sexual Activity    Alcohol use: Not Currently    Drug use: No    Sexual activity: Yes     Partners: Male     Birth control/protection: None   Social History Narrative    ** Merged History Encounter **          Family History   Problem Relation Age of Onset    No Known Problems Mother      Medications:   Current Outpatient Medications Ordered in Epic   Medication Sig Dispense Refill    busPIRone (BUSPAR) 10 MG Tab tablet Take 1 Tablet by mouth every day.       No current [facility]-ordered facility-administered medications on file.          Objective:   Vital measurements:  BP (!) 80/58 (BP Location: Left arm, Patient Position: Sitting, BP Cuff Size: Adult)   Ht 4' 11\"   Wt 112 lb   Body mass index is 22.62 kg/m². (Goal BM I>18 <25)    Physical Exam   Nursing note and vitals reviewed.    Constitutional: She is oriented to person, place, and time. She appears well-developed and well-nourished. No distress.     HEENT:   Head: Normocephalic and atraumatic.   Right Ear: External ear normal.   Left Ear: External ear normal.   Nose: Nose normal.   Eyes: Conjunctivae and EOM are normal. Pupils are equal, round, and reactive to light. No scleral icterus.     Neck: Normal range of motion. Neck supple. No tracheal deviation present. No thyromegaly present.     Pulmonary/Chest: Effort normal and breath sounds normal. No respiratory distress. She has no wheezes. She has no " rales. She exhibits no tenderness.     Cardiovascular: Regular, rate and rhythm. No JVD.    Abdominal: Soft. Bowel sounds are normal. She exhibits no distension and no mass. No tenderness. She has no rebound and no guarding.     Breast:  Pt reports doing self-breast examinations     Genitourinary:  Pelvic exam was deferred as not indicated at this time    Musculoskeletal: Normal range of motion. She exhibits no edema and no tenderness.     Lymphadenopathy: She has no cervical adenopathy.     Neurological: She is alert and oriented to person, place, and time. She exhibits normal muscle tone.     Skin: Skin is warm and dry. No rash noted. She is not diaphoretic. No erythema. No pallor.     Psychiatric: She has a normal mood and affect. Her behavior is normal. Judgment and thought content normal.      Assessment:     Well woman without gynecological exam     Plan:   Pap records requested from 2021 and physical exam performed  STI screening via blood declined today  Monthly self breast exam education provided  HPV vaccine candidate:n/a  Will return for consult with MD about BTL reversal, will request report of this procedure now  Pt reports she does have PCP and blood work has been done   Encourage exercise and proper diet  Mammograms starting @ age 40 annually  Return to clinic: with MD to discuss tubal reversal option

## 2023-07-26 ENCOUNTER — GYNECOLOGY VISIT (OUTPATIENT)
Dept: OBGYN | Facility: CLINIC | Age: 38
End: 2023-07-26
Payer: COMMERCIAL

## 2023-07-26 VITALS
HEIGHT: 59 IN | BODY MASS INDEX: 22.54 KG/M2 | WEIGHT: 111.8 LBS | SYSTOLIC BLOOD PRESSURE: 108 MMHG | DIASTOLIC BLOOD PRESSURE: 58 MMHG

## 2023-07-26 DIAGNOSIS — Z98.51 HISTORY OF TUBAL LIGATION: Primary | ICD-10-CM

## 2023-07-26 PROCEDURE — 99212 OFFICE O/P EST SF 10 MIN: CPT | Performed by: OBSTETRICS & GYNECOLOGY

## 2023-07-26 PROCEDURE — 3074F SYST BP LT 130 MM HG: CPT | Performed by: OBSTETRICS & GYNECOLOGY

## 2023-07-26 PROCEDURE — 3078F DIAST BP <80 MM HG: CPT | Performed by: OBSTETRICS & GYNECOLOGY

## 2023-07-26 ASSESSMENT — FIBROSIS 4 INDEX: FIB4 SCORE: 0.92

## 2023-07-26 NOTE — PROGRESS NOTES
"Subjective     Chelsea Ramos is a 37 y.o. female who presents with Gynecologic Exam (Tubal reversal )    CC: Desires tubal reversal    HPI: Chelsea is a 37-year-old  who presents to discuss tubal reversal.  She had a bilateral tubal ligation .  She does not think she had a salpingectomy.  She has a history of 3 spontaneous vaginal deliveries.            Objective     /58 (BP Location: Left arm, Patient Position: Sitting, BP Cuff Size: Adult)   Ht 4' 11\"   Wt 111 lb 12.8 oz   LMP 2023 (Exact Date)   BMI 22.58 kg/m²      Physical Exam  Constitutional:       Appearance: Normal appearance.   HENT:      Head: Normocephalic and atraumatic.      Mouth/Throat:      Mouth: Mucous membranes are moist.   Eyes:      Extraocular Movements: Extraocular movements intact.   Pulmonary:      Effort: Pulmonary effort is normal. No respiratory distress.   Neurological:      Mental Status: She is alert.                             Assessment & Plan     Chelsea was seen today to discuss tubal reversal    Diagnoses and all orders for this visit:    History of tubal ligation  I discussed that unfortunately I do not do tubal reversal surgeries.  Recommend referral to infertility.  Referral placed and information given for Dr. Mauricio Deleon.  -     Referral to Infertility    Return: Annually or as needed  Usha Adan M.D.              "

## (undated) DEVICE — CLAMP PIN 5 HOLE PIN (2TX4=8)

## (undated) DEVICE — KIT ANESTHESIA W/CIRCUIT & 3/LT BAG W/FILTER (20EA/CA)

## (undated) DEVICE — PADDING CAST 6 IN STERILE - 6 X 4 YDS (24/CA)

## (undated) DEVICE — SUTURE 3-0 ETHILON FS-1 - (36/BX) 30 INCH

## (undated) DEVICE — SUTURE GENERAL

## (undated) DEVICE — GOWN SURGEONS X-LARGE - DISP. (30/CA)

## (undated) DEVICE — CHLORAPREP 26 ML APPLICATOR - ORANGE TINT(25/CA)

## (undated) DEVICE — GLOVE SZ 7.5 BIOGEL PI MICRO - PF LF (50PR/BX)

## (undated) DEVICE — TOWEL STOP TIMEOUT SAFETY FLAG (40EA/CA)

## (undated) DEVICE — TOURNIQUET, STERILE 34 (BLUE)

## (undated) DEVICE — GLOVE BIOGEL SZ 6.5 SURGICAL PF LTX (50PR/BX 4BX/CA)

## (undated) DEVICE — POST ANGLED 30 DEGREE (2TX4=8)

## (undated) DEVICE — GLOVE BIOGEL INDICATOR SZ 8 SURGICAL PF LTX - (50/BX 4BX/CA)

## (undated) DEVICE — PACK LOWER EXTREMITY - (2/CA)

## (undated) DEVICE — PAD LAP STERILE 18 X 18 - (5/PK 40PK/CA)

## (undated) DEVICE — PIN TRANSFIXATION 5MM (2TX4=8)

## (undated) DEVICE — BANDAGE ELASTIC 6 HONEYCOMB - 6X5YD LF (20/CA)"

## (undated) DEVICE — LACTATED RINGERS INJ 1000 ML - (14EA/CA 60CA/PF)

## (undated) DEVICE — DRAPE SURGICAL U 77X120 - (10/CA)

## (undated) DEVICE — GLOVE SIZE 7.0 SURGEON ACCELERATOR FREE GREEN (50PR/BX 4BX/CA)

## (undated) DEVICE — DRILL BIT 1.8MMX80MM CALIBRATED (4TX2=8)

## (undated) DEVICE — SLEEVE, VASO, THIGH, MED

## (undated) DEVICE — MASK ANESTHESIA ADULT  - (100/CA)

## (undated) DEVICE — ELECTRODE DUAL RETURN W/ CORD - (50/PK)

## (undated) DEVICE — GLOVE BIOGEL SZ 7.5 SURGICAL PF LTX - (50PR/BX 4BX/CA)

## (undated) DEVICE — GOWN WARMING STANDARD FLEX - (30/CA)

## (undated) DEVICE — BAR CARBON 11MM X 350MM (2TX4=8)

## (undated) DEVICE — TUBING CLEARLINK DUO-VENT - C-FLO (48EA/CA)

## (undated) DEVICE — SUCTION INSTRUMENT YANKAUER BULBOUS TIP W/O VENT (50EA/CA)

## (undated) DEVICE — SUTURE 0 VICRYL PLUS CT-1 - 8 X 18 INCH (12/BX)

## (undated) DEVICE — CANISTER SUCTION 3000ML MECHANICAL FILTER AUTO SHUTOFF MEDI-VAC NONSTERILE LF DISP  (40EA/CA)

## (undated) DEVICE — MASK, LARYNGEAL AIRWAY #4

## (undated) DEVICE — GLOVE SZ 8 BIOGEL PI MICRO - PF LF (50PR/BX)

## (undated) DEVICE — DRAPE LARGE 3 QUARTER - (20/CA)

## (undated) DEVICE — SENSOR OXIMETER ADULT SPO2 RD SET (20EA/BX)

## (undated) DEVICE — SODIUM CHL IRRIGATION 0.9% 1000ML (12EA/CA)

## (undated) DEVICE — SET LEADWIRE 5 LEAD BEDSIDE DISPOSABLE ECG (1SET OF 5/EA)

## (undated) DEVICE — GLOVE SZ 7 BIOGEL PI MICRO - PF LF (50PR/BX 4BX/CA)

## (undated) DEVICE — PROTECTOR ULNA NERVE - (36PR/CA)

## (undated) DEVICE — PIN HALF SELF TAPPING 5MM X 45MM 150MM LENGTH (2TX6=12)

## (undated) DEVICE — GLOVE BIOGEL PI INDICATOR SZ 8.0 SURGICAL PF LF -(50/BX 4BX/CA)

## (undated) DEVICE — HEAD HOLDER JUNIOR/ADULT

## (undated) DEVICE — SUTURE 2-0 VICRYL PLUS CT-1 - 8 X 18 INCH(12/BX)

## (undated) DEVICE — SPLINT PLASTER 5 IN X 30 IN - (50EA/BX 6BX/CA)

## (undated) DEVICE — SET EXTENSION WITH 2 PORTS (48EA/CA) ***PART #2C8610 IS A SUBSTITUTE*****

## (undated) DEVICE — NEPTUNE 4 PORT MANIFOLD - (20/PK)

## (undated) DEVICE — PAD PREP 24 X 48 CUFFED - (100/CA)

## (undated) DEVICE — BLADE SURGICAL #10 - (50/BX)

## (undated) DEVICE — DRAPE 36X28IN RAD CARM BND BG - (25/CA) O

## (undated) DEVICE — ELECTRODE 850 FOAM ADHESIVE - HYDROGEL RADIOTRNSPRNT (50/PK)

## (undated) DEVICE — DRILL BIT 2.8MM X 155MM CALIBRATED (8TX2=16)

## (undated) DEVICE — CLAMP 11MM BAR TO 11MM BAR 5MM PIN(2TX20=40)